# Patient Record
Sex: FEMALE | Race: WHITE | NOT HISPANIC OR LATINO | Employment: UNEMPLOYED | URBAN - METROPOLITAN AREA
[De-identification: names, ages, dates, MRNs, and addresses within clinical notes are randomized per-mention and may not be internally consistent; named-entity substitution may affect disease eponyms.]

---

## 2018-02-20 PROBLEM — M79.642 PAIN IN BOTH HANDS: Status: ACTIVE | Noted: 2018-02-20

## 2018-02-20 PROBLEM — M79.641 PAIN IN BOTH HANDS: Status: ACTIVE | Noted: 2018-02-20

## 2019-10-18 DIAGNOSIS — M79.672 PAIN IN LEFT FOOT: Primary | ICD-10-CM

## 2019-10-24 ENCOUNTER — OFFICE VISIT (OUTPATIENT)
Dept: ORTHOPEDICS | Facility: CLINIC | Age: 64
End: 2019-10-24
Payer: COMMERCIAL

## 2019-10-24 ENCOUNTER — HOSPITAL ENCOUNTER (OUTPATIENT)
Dept: RADIOLOGY | Facility: HOSPITAL | Age: 64
Discharge: HOME | End: 2019-10-24
Attending: ORTHOPAEDIC SURGERY
Payer: COMMERCIAL

## 2019-10-24 VITALS
HEIGHT: 67 IN | DIASTOLIC BLOOD PRESSURE: 76 MMHG | HEART RATE: 90 BPM | SYSTOLIC BLOOD PRESSURE: 144 MMHG | BODY MASS INDEX: 24.12 KG/M2 | WEIGHT: 153.69 LBS

## 2019-10-24 DIAGNOSIS — M79.672 PAIN IN LEFT FOOT: ICD-10-CM

## 2019-10-24 DIAGNOSIS — M20.5X2 CLAWTOE, ACQUIRED, LEFT: ICD-10-CM

## 2019-10-24 PROCEDURE — 73630 XR FOOT COMPLETE 3 VIEW LEFT: ICD-10-PCS | Mod: 26,LT,, | Performed by: RADIOLOGY

## 2019-10-24 PROCEDURE — 99999 PR PBB SHADOW E&M-EST. PATIENT-LVL III: ICD-10-PCS | Mod: PBBFAC,,, | Performed by: ORTHOPAEDIC SURGERY

## 2019-10-24 PROCEDURE — 99999 PR PBB SHADOW E&M-EST. PATIENT-LVL III: CPT | Mod: PBBFAC,,, | Performed by: ORTHOPAEDIC SURGERY

## 2019-10-24 PROCEDURE — 99203 OFFICE O/P NEW LOW 30 MIN: CPT | Mod: S$GLB,,, | Performed by: ORTHOPAEDIC SURGERY

## 2019-10-24 PROCEDURE — 73630 X-RAY EXAM OF FOOT: CPT | Mod: TC,PO,LT

## 2019-10-24 PROCEDURE — 73630 X-RAY EXAM OF FOOT: CPT | Mod: 26,LT,, | Performed by: RADIOLOGY

## 2019-10-24 PROCEDURE — 99203 PR OFFICE/OUTPT VISIT, NEW, LEVL III, 30-44 MIN: ICD-10-PCS | Mod: S$GLB,,, | Performed by: ORTHOPAEDIC SURGERY

## 2019-10-24 RX ORDER — IBUPROFEN 800 MG/1
800 TABLET ORAL 3 TIMES DAILY PRN
Qty: 60 TABLET | Refills: 3 | Status: ON HOLD | OUTPATIENT
Start: 2019-10-24 | End: 2020-11-30 | Stop reason: HOSPADM

## 2019-10-24 NOTE — LETTER
October 28, 2019      Eleazar Soler MD  [address] [facility] [address]  [address]  [facility] [address]           [facility]  [address] [place] [address]  Phone: [phone]          Patient: Arleen Oconnell   MR Number: [id number]   YOB: 1955   Date of Visit: 10/24/2019       Dear Dr. Eleazar Soler:    Thank you for referring Arleen Oconnell to me for evaluation. Attached you will find relevant portions of my assessment and plan of care.    If you have questions, please do not hesitate to call me. I look forward to following Arleen Oconnell along with you.    Sincerely,    Brian Cain MD    Enclosure  CC:  No Recipients    If you would like to receive this communication electronically, please contact [email or web address] or [phone] to request more information on [facility] [employer] access.    For providers and/or their staff who would like to refer a patient to [facility], please contact us through our one-stop-shop provider referral line, [facility], at [phone].    If you feel you have received this communication in error or would no longer like to receive these types of communications, please e-mail [email or web address]

## 2019-10-28 NOTE — PROGRESS NOTES
"HPI: Arleen Oconnell is a  64 y.o. female who was referred to me by Dr. Eleazar Soler and was seen in consultation today for left 2nd toe pain. She had bunion surgery several years ago and did well with that. She notices 2nd toe toe has become very painful and is starting to cross the great toe over the great toe. Pt denies weakness, numbness, and tingling. She rates her pain as 5/10 today. The pain is worse with shoe wear.      PAST MEDICAL/SURGICAL/FAMILY/SOCIAL/ HISTORY: REVIEWED    ALLERGIES/MEDICATIONS: REVIEWED       Review of Systems:     Constitution: Negative.   HEENT: Negative.   Eyes: Negative.   Cardiovascular: Negative.   Respiratory: Negative.   Endocrine: Negative.   Hematologic/Lymphatic: Negative.   Skin: Negative.   Musculoskeletal: Positive for left 2nd toe pain   Gastrointestinal: Negative.   Genitourinary: Negative.   Neurological: Negative.   Psychiatric/Behavioral: Negative.   Allergic/Immunologic: Negative.       PHYSICAL EXAM:  Vitals:    10/24/19 1311   BP: (!) 144/76   Pulse: 90     Ht Readings from Last 1 Encounters:   10/24/19 5' 7" (1.702 m)     Wt Readings from Last 1 Encounters:   10/24/19 69.7 kg (153 lb 10.6 oz)       GENERAL: Well developed, well nourished, no acute distress. Very pleasant.   SKIN: Skin is intact. No atrophy, abrasions or lesions are noted.   Neurological: Normal mental status. Appropriate and conversant. Alert and oriented x 3.  GAIT: Walks with non-antalgic gait.    Left  lower extremity compared with RLE:  2+ dorsalis pedis pulse.  Capillary refill < 3 seconds.  Normal range of motion tibiotalar and subtalar joints. Normal alignment of the forefoot and the hindfoot.  5/5 strength EHL, FHL, tibialis anterior, gastrocsoleus, tibialis posterior and peroneals. Sensation to light touch intact sural, saphenous, superficial peroneal and deep peroneal nerves.  No lymphadenopathy, no masses or tumors palpated.  Severe claw toe deformity with crossing over the great toe. "     XRAYS:   3 views of left foot obtained and reviewed today reveal 2nd claw toe with dorsal dislocation of the 2nd toe. Mild to moderate hallux valgus GABY 14 and HVA 25.2      ASSESSMENT:   Medications Ordered This Encounter   Medications    ibuprofen (ADVIL,MOTRIN) 800 MG tablet       Encounter Diagnosis   Name Primary?    Clawtoe, acquired, left         PLAN:  I spent 35 minutes in consulation with the patient today. More than half the time was spent counseling the patient on her condition and the options for operative versus non-operative care.  She has tried, shoe modification,  taping and strapping the toe but it is still quite painful. I recommend 2nd PIPJ resection arthoplasty, EDB tendon transfer and pinning of the 2nd mptj. I have explained the procedure, including indications, risks, and alternatives.  Arleen VUONG Marylinzuleyka gave informed consent and is medically ready for surgery.

## 2019-11-08 DIAGNOSIS — M20.5X2 CLAW TOE, ACQUIRED, LEFT: ICD-10-CM

## 2019-11-08 DIAGNOSIS — M20.5X2 CLAWTOE, ACQUIRED, LEFT: Primary | ICD-10-CM

## 2019-11-08 RX ORDER — SODIUM CHLORIDE 9 MG/ML
INJECTION, SOLUTION INTRAVENOUS CONTINUOUS
Status: CANCELLED | OUTPATIENT
Start: 2019-11-08

## 2019-11-11 ENCOUNTER — TELEPHONE (OUTPATIENT)
Dept: ORTHOPEDICS | Facility: CLINIC | Age: 64
End: 2019-11-11

## 2019-11-11 NOTE — TELEPHONE ENCOUNTER
----- Message from Aroldo Hernandez MA sent at 11/11/2019 11:46 AM CST -----  Contact: patient  Patient is having surgery on 12/2, will be out of town the entire week before, is there any pre op information she needs to know prior to surgery.  Call back

## 2019-11-18 ENCOUNTER — TELEPHONE (OUTPATIENT)
Dept: ORTHOPEDICS | Facility: CLINIC | Age: 64
End: 2019-11-18

## 2019-11-18 NOTE — TELEPHONE ENCOUNTER
----- Message from Aroldo Hernandez MA sent at 11/18/2019  2:48 PM CST -----  Contact: Patient  Patient needs to reschedule surgery  [facility]

## 2019-11-18 NOTE — TELEPHONE ENCOUNTER
----- Message from Rola Frias MA sent at 11/18/2019  1:21 PM CST -----  Contact: hannah   Wants to reschedule surgery, one week later   Call back

## 2019-12-05 ENCOUNTER — TELEPHONE (OUTPATIENT)
Dept: ORTHOPEDICS | Facility: CLINIC | Age: 64
End: 2019-12-05

## 2019-12-05 NOTE — TELEPHONE ENCOUNTER
Spoke to patient. Patient canceled surgery due to having to have gall bladder removed. Surgery canceled.

## 2019-12-05 NOTE — TELEPHONE ENCOUNTER
----- Message from Quentin Armas sent at 12/5/2019 12:37 PM CST -----  Contact: pr  Needs to cancel Sx, [phone]

## 2019-12-20 PROBLEM — K58.1 IRRITABLE BOWEL SYNDROME WITH CONSTIPATION: Status: ACTIVE | Noted: 2019-12-20

## 2020-01-15 PROBLEM — K80.20 SYMPTOMATIC CHOLELITHIASIS: Status: ACTIVE | Noted: 2020-01-15

## 2020-01-24 PROBLEM — K80.20 SYMPTOMATIC CHOLELITHIASIS: Status: RESOLVED | Noted: 2020-01-15 | Resolved: 2020-01-24

## 2020-05-08 ENCOUNTER — NURSE TRIAGE (OUTPATIENT)
Dept: ADMINISTRATIVE | Facility: CLINIC | Age: 65
End: 2020-05-08

## 2020-05-08 NOTE — TELEPHONE ENCOUNTER
Pt reports recent dx of left ear infection. She reports Covid symptoms. Reports constant heaviness in chest. Per protocol, she was advised to be seen in the ED for further evaluation.     Reason for Disposition   SEVERE or constant chest pain (Exception: mild central chest pain, present only when coughing)    Additional Information   Negative: SEVERE difficulty breathing (e.g., struggling for each breath, speaks in single words)   Negative: Difficult to awaken or acting confused (e.g., disoriented, slurred speech)   Negative: Bluish (or gray) lips or face now   Negative: Shock suspected (e.g., cold/pale/clammy skin, too weak to stand, low BP, rapid pulse)   Negative: Sounds like a life-threatening emergency to the triager    Protocols used: CORONAVIRUS (COVID-19) DIAGNOSED OR [id number]

## 2020-05-11 NOTE — TELEPHONE ENCOUNTER
Patient notified of Dr Castañeda's recommendations as written and verbalized understanding.  She states that she will call me for names of specialist if symptoms return or ear worsens.

## 2020-05-11 NOTE — TELEPHONE ENCOUNTER
"Triage nurse message below from 5/8 at 547 pm per routing hx. Spoke to pt. She did not go to ER. She went to urgent care on 4/28 for lt ear pain. Was given antibiotic and told to take an antihistamine. She states that it got better but still feels "a little twinge". She states that she has been taking her temp and no fever. She reports that she keeps a chronic cough due to allergies. She states that the heaviness in her chest is gone. Do you want to see here or refer to ENT?  "

## 2020-10-12 DIAGNOSIS — M79.672 PAIN IN LEFT FOOT: Primary | ICD-10-CM

## 2020-10-29 ENCOUNTER — OFFICE VISIT (OUTPATIENT)
Dept: ORTHOPEDICS | Facility: CLINIC | Age: 65
End: 2020-10-29
Payer: COMMERCIAL

## 2020-10-29 ENCOUNTER — HOSPITAL ENCOUNTER (OUTPATIENT)
Dept: RADIOLOGY | Facility: HOSPITAL | Age: 65
Discharge: HOME | End: 2020-10-29
Attending: ORTHOPAEDIC SURGERY
Payer: COMMERCIAL

## 2020-10-29 VITALS
SYSTOLIC BLOOD PRESSURE: 160 MMHG | HEIGHT: 67 IN | DIASTOLIC BLOOD PRESSURE: 72 MMHG | WEIGHT: 158.94 LBS | HEART RATE: 78 BPM | BODY MASS INDEX: 24.94 KG/M2

## 2020-10-29 DIAGNOSIS — M79.672 PAIN IN LEFT FOOT: ICD-10-CM

## 2020-10-29 DIAGNOSIS — M20.5X2 CLAWTOE, ACQUIRED, LEFT: Primary | ICD-10-CM

## 2020-10-29 PROCEDURE — 99999 PR PBB SHADOW E&M-EST. PATIENT-LVL III: ICD-10-PCS | Mod: PBBFAC,,, | Performed by: ORTHOPAEDIC SURGERY

## 2020-10-29 PROCEDURE — 1159F MED LIST DOCD IN RCRD: CPT | Mod: S$GLB,,, | Performed by: ORTHOPAEDIC SURGERY

## 2020-10-29 PROCEDURE — 3078F DIAST BP <80 MM HG: CPT | Mod: CPTII,S$GLB,, | Performed by: ORTHOPAEDIC SURGERY

## 2020-10-29 PROCEDURE — 73630 X-RAY EXAM OF FOOT: CPT | Mod: TC,PO,LT

## 2020-10-29 PROCEDURE — 3077F PR MOST RECENT SYSTOLIC BLOOD PRESSURE >= 140 MM HG: ICD-10-PCS | Mod: CPTII,S$GLB,, | Performed by: ORTHOPAEDIC SURGERY

## 2020-10-29 PROCEDURE — 3008F PR BODY MASS INDEX (BMI) DOCUMENTED: ICD-10-PCS | Mod: CPTII,S$GLB,, | Performed by: ORTHOPAEDIC SURGERY

## 2020-10-29 PROCEDURE — 99214 PR OFFICE/OUTPT VISIT, EST, LEVL IV, 30-39 MIN: ICD-10-PCS | Mod: S$GLB,,, | Performed by: ORTHOPAEDIC SURGERY

## 2020-10-29 PROCEDURE — 73630 X-RAY EXAM OF FOOT: CPT | Mod: 26,LT,, | Performed by: RADIOLOGY

## 2020-10-29 PROCEDURE — 1159F PR MEDICATION LIST DOCUMENTED IN MEDICAL RECORD: ICD-10-PCS | Mod: S$GLB,,, | Performed by: ORTHOPAEDIC SURGERY

## 2020-10-29 PROCEDURE — 73630 XR FOOT COMPLETE 3 VIEW LEFT: ICD-10-PCS | Mod: 26,LT,, | Performed by: RADIOLOGY

## 2020-10-29 PROCEDURE — 1126F AMNT PAIN NOTED NONE PRSNT: CPT | Mod: S$GLB,,, | Performed by: ORTHOPAEDIC SURGERY

## 2020-10-29 PROCEDURE — 1101F PT FALLS ASSESS-DOCD LE1/YR: CPT | Mod: CPTII,S$GLB,, | Performed by: ORTHOPAEDIC SURGERY

## 2020-10-29 PROCEDURE — 99999 PR PBB SHADOW E&M-EST. PATIENT-LVL III: CPT | Mod: PBBFAC,,, | Performed by: ORTHOPAEDIC SURGERY

## 2020-10-29 PROCEDURE — 3078F PR MOST RECENT DIASTOLIC BLOOD PRESSURE < 80 MM HG: ICD-10-PCS | Mod: CPTII,S$GLB,, | Performed by: ORTHOPAEDIC SURGERY

## 2020-10-29 PROCEDURE — 3008F BODY MASS INDEX DOCD: CPT | Mod: CPTII,S$GLB,, | Performed by: ORTHOPAEDIC SURGERY

## 2020-10-29 PROCEDURE — 3077F SYST BP >= 140 MM HG: CPT | Mod: CPTII,S$GLB,, | Performed by: ORTHOPAEDIC SURGERY

## 2020-10-29 PROCEDURE — 99214 OFFICE O/P EST MOD 30 MIN: CPT | Mod: S$GLB,,, | Performed by: ORTHOPAEDIC SURGERY

## 2020-10-29 PROCEDURE — 1126F PR PAIN SEVERITY QUANTIFIED, NO PAIN PRESENT: ICD-10-PCS | Mod: S$GLB,,, | Performed by: ORTHOPAEDIC SURGERY

## 2020-10-29 PROCEDURE — 1101F PR PT FALLS ASSESS DOC 0-1 FALLS W/OUT INJ PAST YR: ICD-10-PCS | Mod: CPTII,S$GLB,, | Performed by: ORTHOPAEDIC SURGERY

## 2020-10-29 NOTE — NURSING
Instructed patient on preop instructions. Patient to stop taking any blood thinning medications at least seven days prior to scheduled surgery. Patient to not take any NSAIDS at least 7 days prior to surgery and will resume when Dr. Cain instructs them to as it can delay bone healing. Nothing to eat/drink after midnight the night prior to surgery.   Patient instructed on postop care. Instructions included to remain non weight bearing until instructed otherwise by Dr. Cain. Post op dressing can not get wet. If it gets wet patient to call office immediately or go to [facility] ER to have it replaced. Patient to keep affected limb elevated higher than the heart at all times after surgery to decrease swelling. Further instructions given to patient on preop/postop instruction handout. No NSAID form given to patient. Handout on knee scooter given to patient as well. Patient verbalized understanding.

## 2020-10-29 NOTE — PROGRESS NOTES
"HPI: Arleen Oconnell is a  65 y.o. female who was referred to me by Dr. Eleazar Soler and was seen for f/u  today for left 2nd toe pain. She had bunion surgery several years ago and did well with that. She notices 2nd toe toe has become very painful and is starting to cross the great toe over the great toe. Pt denies weakness, numbness, and tingling. She rates her pain as 0/10 today but the pain is intermittent. The pain is worse with shoe wear. She was scheduled for surgery with me about a year ago and had to reschedule.      PAST MEDICAL/SURGICAL/FAMILY/SOCIAL/ HISTORY: REVIEWED    ALLERGIES/MEDICATIONS: REVIEWED       Review of Systems:     Constitution: Negative.   HEENT: Negative.   Eyes: Negative.   Cardiovascular: Negative.   Respiratory: Negative.   Endocrine: Negative.   Hematologic/Lymphatic: Negative.   Skin: Negative.   Musculoskeletal: Positive for left 2nd toe pain   Gastrointestinal: Negative.   Genitourinary: Negative.   Neurological: Negative.   Psychiatric/Behavioral: Negative.   Allergic/Immunologic: Negative.       PHYSICAL EXAM:  There were no vitals filed for this visit.  Ht Readings from Last 1 Encounters:   10/27/20 5' 7" (1.702 m)     Wt Readings from Last 1 Encounters:   10/27/20 72.1 kg (159 lb)       GENERAL: Well developed, well nourished, no acute distress. Very pleasant.   SKIN: Skin is intact. No atrophy, abrasions or lesions are noted.   Neurological: Normal mental status. Appropriate and conversant. Alert and oriented x 3.  GAIT: Walks with non-antalgic gait.    Left  lower extremity compared with RLE:  2+ dorsalis pedis pulse.  Capillary refill < 3 seconds.  Normal range of motion tibiotalar and subtalar joints. Normal alignment of the forefoot and the hindfoot.  5/5 strength EHL, FHL, tibialis anterior, gastrocsoleus, tibialis posterior and peroneals. Sensation to light touch intact sural, saphenous, superficial peroneal and deep peroneal nerves.  No lymphadenopathy, no masses or " tumors palpated.  Severe claw toe deformity with crossing over the great toe which has worsened over the past year.     XRAYS:   3 views of left foot obtained and reviewed today reveal 2nd claw toe with dorsal dislocation of the 2nd toe. Mild to moderate hallux valgus GABY 14 and HVA 25.2      ASSESSMENT:    Left 2nd toe clawtoe with crossover deformity    PLAN: She has tried, shoe modification,  taping and strapping the toe but it is still quite painful. I recommend 2nd PIPJ resection arthoplasty, EDB tendon transfer and pinning of the 2nd mptj. I have explained the procedure, including indications, risks, and alternatives.  Arleen Oconnell gave informed consent and is medically ready for surgery.

## 2020-11-10 ENCOUNTER — TELEPHONE (OUTPATIENT)
Dept: ORTHOPEDICS | Facility: CLINIC | Age: 65
End: 2020-11-10

## 2020-11-10 NOTE — TELEPHONE ENCOUNTER
----- Message from Loreto Billingsley sent at 11/10/2020  4:27 PM CST -----  Good afternoon cpt codes 48257,  36250 were approved but cpt code 94805 was denied it didn't meet medical necessity a peer 2 peer can be done by calling [phone] using ref#[id number]

## 2020-11-13 NOTE — TELEPHONE ENCOUNTER
Called jon, spoke to Valerie in the [id number] dept. Scheduled [id number] for 11/16/2020 at 430 pm. Their provider will call our office at that time to speak to Dr. Cain.

## 2020-11-16 ENCOUNTER — TELEPHONE (OUTPATIENT)
Dept: ORTHOPEDICS | Facility: CLINIC | Age: 65
End: 2020-11-16

## 2020-11-16 NOTE — TELEPHONE ENCOUNTER
----- Message from Angelique Bender sent at 11/16/2020  2:34 PM CST -----  Regarding: advice  Contact: TRAY with [employer]  Type: Needs Medical Advice  Who Called:  Tray with [employer]  Best Call Back Number: [phone]  Additional Information: Tray is trying to get her medication Mupirocin to her. Patient states the mail where she lives is unreliable. Ty is asking if she can send the medication  to the office for patient to . Please call Ty. Thanks!

## 2020-11-16 NOTE — TELEPHONE ENCOUNTER
Called Ty back. She states that they need a physical address to send out pts mupirocin. Pt told Ty that there is a problem getting [employer] deliveries at her home. Ty wanted to know if the medication could be sent to our office? Advised her that it could be sent here, but it may not get to the correct department on time. She will speak to pt to see where else they can send it. Thanks, Eve

## 2020-11-17 NOTE — TELEPHONE ENCOUNTER
Dr. Cain spoke to Dr. Mcclain today. Dr. Mcclain said that the code has been approved. He said that we need to resubmit the claim with all 3 of the CPT codes again. He said that there is not an auth number that he can give out. Please resubmit the 3 codes again. Thanks, Eve

## 2020-11-17 NOTE — TELEPHONE ENCOUNTER
Called [facility] back. Spoke to Konrad today. No one called yesterday at 430 pm to do the peer to peer. Konrad looked it up and states that the call was scheduled for this Friday at 430pm. Rescheduled call to today at 4 pm. Thanks, Eve

## 2020-11-27 ENCOUNTER — ANESTHESIA EVENT (OUTPATIENT)
Dept: SURGERY | Facility: HOSPITAL | Age: 65
End: 2020-11-27
Payer: COMMERCIAL

## 2020-11-27 ENCOUNTER — LAB VISIT (OUTPATIENT)
Dept: FAMILY MEDICINE | Facility: CLINIC | Age: 65
End: 2020-11-27
Payer: COMMERCIAL

## 2020-11-27 DIAGNOSIS — M20.5X2 CLAWTOE, ACQUIRED, LEFT: ICD-10-CM

## 2020-11-27 PROCEDURE — U0003 INFECTIOUS AGENT DETECTION BY NUCLEIC ACID (DNA OR RNA); SEVERE ACUTE RESPIRATORY SYNDROME CORONAVIRUS 2 (SARS-COV-2) (CORONAVIRUS DISEASE [COVID-19]), AMPLIFIED PROBE TECHNIQUE, MAKING USE OF HIGH THROUGHPUT TECHNOLOGIES AS DESCRIBED BY CMS-2020-01-R: HCPCS

## 2020-11-28 LAB — SARS-COV-2 RNA RESP QL NAA+PROBE: NOT DETECTED

## 2020-11-30 ENCOUNTER — HOSPITAL ENCOUNTER (OUTPATIENT)
Dept: RADIOLOGY | Facility: HOSPITAL | Age: 65
Discharge: HOME | End: 2020-11-30
Attending: ORTHOPAEDIC SURGERY | Admitting: ORTHOPAEDIC SURGERY
Payer: MEDICARE

## 2020-11-30 ENCOUNTER — ANESTHESIA (OUTPATIENT)
Dept: SURGERY | Facility: HOSPITAL | Age: 65
End: 2020-11-30
Payer: COMMERCIAL

## 2020-11-30 ENCOUNTER — HOSPITAL ENCOUNTER (OUTPATIENT)
Facility: HOSPITAL | Age: 65
Discharge: HOME | End: 2020-11-30
Attending: ORTHOPAEDIC SURGERY | Admitting: ORTHOPAEDIC SURGERY
Payer: COMMERCIAL

## 2020-11-30 DIAGNOSIS — S92.902A FOOT FRACTURE, LEFT: ICD-10-CM

## 2020-11-30 DIAGNOSIS — M20.5X2 CLAWTOE, ACQUIRED, LEFT: Primary | ICD-10-CM

## 2020-11-30 PROCEDURE — 37000009 HC ANESTHESIA EA ADD 15 MINS: Mod: PO | Performed by: ORTHOPAEDIC SURGERY

## 2020-11-30 PROCEDURE — 27201423 OPTIME MED/SURG SUP & DEVICES STERILE SUPPLY: Mod: PO | Performed by: ORTHOPAEDIC SURGERY

## 2020-11-30 PROCEDURE — 25000003 PHARM REV CODE 250: Mod: PO | Performed by: ANESTHESIOLOGY

## 2020-11-30 PROCEDURE — 25000003 PHARM REV CODE 250: Mod: PO | Performed by: ORTHOPAEDIC SURGERY

## 2020-11-30 PROCEDURE — 28645 PR OPEN TX METATARSOPHALANGEAL JOINT DISLOCATION: ICD-10-PCS | Mod: LT,,, | Performed by: ORTHOPAEDIC SURGERY

## 2020-11-30 PROCEDURE — C1776 JOINT DEVICE (IMPLANTABLE): HCPCS | Mod: PO | Performed by: ORTHOPAEDIC SURGERY

## 2020-11-30 PROCEDURE — 36000709 HC OR TIME LEV III EA ADD 15 MIN: Mod: PO | Performed by: ORTHOPAEDIC SURGERY

## 2020-11-30 PROCEDURE — 28313 REPAIR DEFORMITY OF TOE: CPT | Mod: 59,51,T1, | Performed by: ORTHOPAEDIC SURGERY

## 2020-11-30 PROCEDURE — 28313 PR RECONSTRUC TOE DEFORM,SOFT TISSUE: ICD-10-PCS | Mod: 59,51,T1, | Performed by: ORTHOPAEDIC SURGERY

## 2020-11-30 PROCEDURE — 37000008 HC ANESTHESIA 1ST 15 MINUTES: Mod: PO | Performed by: ORTHOPAEDIC SURGERY

## 2020-11-30 PROCEDURE — 63600175 PHARM REV CODE 636 W HCPCS: Mod: PO | Performed by: ORTHOPAEDIC SURGERY

## 2020-11-30 PROCEDURE — D9220A PRA ANESTHESIA: Mod: ANES,,, | Performed by: ANESTHESIOLOGY

## 2020-11-30 PROCEDURE — D9220A PRA ANESTHESIA: ICD-10-PCS | Mod: ANES,,, | Performed by: ANESTHESIOLOGY

## 2020-11-30 PROCEDURE — 76000 FLUOROSCOPY <1 HR PHYS/QHP: CPT | Mod: TC,PO

## 2020-11-30 PROCEDURE — D9220A PRA ANESTHESIA: ICD-10-PCS | Mod: CRNA,,, | Performed by: NURSE ANESTHETIST, CERTIFIED REGISTERED

## 2020-11-30 PROCEDURE — 28285 PR REPAIR OF HAMMERTOE,ONE: ICD-10-PCS | Mod: 51,T1,, | Performed by: ORTHOPAEDIC SURGERY

## 2020-11-30 PROCEDURE — 36000708 HC OR TIME LEV III 1ST 15 MIN: Mod: PO | Performed by: ORTHOPAEDIC SURGERY

## 2020-11-30 PROCEDURE — 25000003 PHARM REV CODE 250: Mod: PO | Performed by: NURSE ANESTHETIST, CERTIFIED REGISTERED

## 2020-11-30 PROCEDURE — 71000016 HC POSTOP RECOV ADDL HR: Mod: PO | Performed by: ORTHOPAEDIC SURGERY

## 2020-11-30 PROCEDURE — D9220A PRA ANESTHESIA: Mod: CRNA,,, | Performed by: NURSE ANESTHETIST, CERTIFIED REGISTERED

## 2020-11-30 PROCEDURE — 71000033 HC RECOVERY, INTIAL HOUR: Mod: PO | Performed by: ORTHOPAEDIC SURGERY

## 2020-11-30 PROCEDURE — 28645 REPAIR TOE DISLOCATION: CPT | Mod: LT,,, | Performed by: ORTHOPAEDIC SURGERY

## 2020-11-30 PROCEDURE — 28285 REPAIR OF HAMMERTOE: CPT | Mod: 51,T1,, | Performed by: ORTHOPAEDIC SURGERY

## 2020-11-30 PROCEDURE — 63600175 PHARM REV CODE 636 W HCPCS: Mod: PO | Performed by: NURSE ANESTHETIST, CERTIFIED REGISTERED

## 2020-11-30 PROCEDURE — 71000015 HC POSTOP RECOV 1ST HR: Mod: PO | Performed by: ORTHOPAEDIC SURGERY

## 2020-11-30 PROCEDURE — 63600175 PHARM REV CODE 636 W HCPCS: Mod: PO | Performed by: ANESTHESIOLOGY

## 2020-11-30 DEVICE — TOE HAMMERTOE SMALL: Type: IMPLANTABLE DEVICE | Site: TOE | Status: FUNCTIONAL

## 2020-11-30 RX ORDER — BUPIVACAINE HYDROCHLORIDE 5 MG/ML
INJECTION, SOLUTION EPIDURAL; INTRACAUDAL
Status: DISCONTINUED | OUTPATIENT
Start: 2020-11-30 | End: 2020-11-30 | Stop reason: HOSPADM

## 2020-11-30 RX ORDER — LIDOCAINE HYDROCHLORIDE 10 MG/ML
1 INJECTION, SOLUTION EPIDURAL; INFILTRATION; INTRACAUDAL; PERINEURAL ONCE
Status: DISCONTINUED | OUTPATIENT
Start: 2020-11-30 | End: 2020-11-30 | Stop reason: HOSPADM

## 2020-11-30 RX ORDER — FENTANYL CITRATE 50 UG/ML
INJECTION, SOLUTION INTRAMUSCULAR; INTRAVENOUS
Status: DISCONTINUED | OUTPATIENT
Start: 2020-11-30 | End: 2020-11-30

## 2020-11-30 RX ORDER — FENTANYL CITRATE 50 UG/ML
25 INJECTION, SOLUTION INTRAMUSCULAR; INTRAVENOUS EVERY 5 MIN PRN
Status: DISCONTINUED | OUTPATIENT
Start: 2020-11-30 | End: 2020-11-30 | Stop reason: HOSPADM

## 2020-11-30 RX ORDER — SODIUM CHLORIDE, SODIUM LACTATE, POTASSIUM CHLORIDE, CALCIUM CHLORIDE 600; 310; 30; 20 MG/100ML; MG/100ML; MG/100ML; MG/100ML
INJECTION, SOLUTION INTRAVENOUS CONTINUOUS
Status: DISCONTINUED | OUTPATIENT
Start: 2020-11-30 | End: 2020-11-30 | Stop reason: HOSPADM

## 2020-11-30 RX ORDER — HYDROMORPHONE HYDROCHLORIDE 2 MG/ML
0.2 INJECTION, SOLUTION INTRAMUSCULAR; INTRAVENOUS; SUBCUTANEOUS EVERY 5 MIN PRN
Status: DISCONTINUED | OUTPATIENT
Start: 2020-11-30 | End: 2020-11-30 | Stop reason: HOSPADM

## 2020-11-30 RX ORDER — OXYCODONE AND ACETAMINOPHEN 10; 325 MG/1; MG/1
1 TABLET ORAL EVERY 4 HOURS PRN
Qty: 24 TABLET | Refills: 0 | Status: SHIPPED | OUTPATIENT
Start: 2020-11-30 | End: 2020-12-04

## 2020-11-30 RX ORDER — FAMOTIDINE 10 MG/ML
20 INJECTION INTRAVENOUS ONCE
Status: COMPLETED | OUTPATIENT
Start: 2020-11-30 | End: 2020-11-30

## 2020-11-30 RX ORDER — PROPOFOL 10 MG/ML
VIAL (ML) INTRAVENOUS
Status: DISCONTINUED | OUTPATIENT
Start: 2020-11-30 | End: 2020-11-30

## 2020-11-30 RX ORDER — MUPIROCIN 20 MG/G
OINTMENT TOPICAL
Status: DISCONTINUED | OUTPATIENT
Start: 2020-11-30 | End: 2020-11-30 | Stop reason: HOSPADM

## 2020-11-30 RX ORDER — OXYCODONE HYDROCHLORIDE 5 MG/1
5 TABLET ORAL
Status: DISCONTINUED | OUTPATIENT
Start: 2020-11-30 | End: 2020-11-30 | Stop reason: HOSPADM

## 2020-11-30 RX ORDER — LIDOCAINE HCL/PF 100 MG/5ML
SYRINGE (ML) INTRAVENOUS
Status: DISCONTINUED | OUTPATIENT
Start: 2020-11-30 | End: 2020-11-30

## 2020-11-30 RX ORDER — SCOLOPAMINE TRANSDERMAL SYSTEM 1 MG/1
1 PATCH, EXTENDED RELEASE TRANSDERMAL ONCE
Status: DISCONTINUED | OUTPATIENT
Start: 2020-11-30 | End: 2020-11-30 | Stop reason: HOSPADM

## 2020-11-30 RX ORDER — MIDAZOLAM HYDROCHLORIDE 1 MG/ML
INJECTION INTRAMUSCULAR; INTRAVENOUS
Status: DISCONTINUED | OUTPATIENT
Start: 2020-11-30 | End: 2020-11-30

## 2020-11-30 RX ORDER — LIDOCAINE HYDROCHLORIDE 20 MG/ML
INJECTION, SOLUTION EPIDURAL; INFILTRATION; INTRACAUDAL; PERINEURAL
Status: DISCONTINUED | OUTPATIENT
Start: 2020-11-30 | End: 2020-11-30 | Stop reason: HOSPADM

## 2020-11-30 RX ORDER — CEFAZOLIN SODIUM 2 G/50ML
2 SOLUTION INTRAVENOUS
Status: COMPLETED | OUTPATIENT
Start: 2020-11-30 | End: 2020-11-30

## 2020-11-30 RX ADMIN — FAMOTIDINE 20 MG: 10 INJECTION INTRAVENOUS at 06:11

## 2020-11-30 RX ADMIN — PROPOFOL 150 MG: 10 INJECTION, EMULSION INTRAVENOUS at 07:11

## 2020-11-30 RX ADMIN — MIDAZOLAM HYDROCHLORIDE 2 MG: 1 INJECTION, SOLUTION INTRAMUSCULAR; INTRAVENOUS at 06:11

## 2020-11-30 RX ADMIN — SODIUM CHLORIDE, SODIUM LACTATE, POTASSIUM CHLORIDE, AND CALCIUM CHLORIDE: .6; .31; .03; .02 INJECTION, SOLUTION INTRAVENOUS at 06:11

## 2020-11-30 RX ADMIN — NITROGLYCERIN 0.5 INCH: 20 OINTMENT TOPICAL at 09:11

## 2020-11-30 RX ADMIN — FENTANYL CITRATE 50 MCG: 50 INJECTION, SOLUTION INTRAMUSCULAR; INTRAVENOUS at 07:11

## 2020-11-30 RX ADMIN — CEFAZOLIN SODIUM 2 G: 2 SOLUTION INTRAVENOUS at 06:11

## 2020-11-30 RX ADMIN — LIDOCAINE HYDROCHLORIDE 100 MG: 20 INJECTION PARENTERAL at 07:11

## 2020-11-30 RX ADMIN — SCOPALAMINE 1 PATCH: 1 PATCH, EXTENDED RELEASE TRANSDERMAL at 06:11

## 2020-11-30 NOTE — BRIEF OP NOTE
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 toe clawtoe with severe crossover deformity 2) Leftt 2nd metatarsalphalangeal joint dislocation    PROCEDURE: 1) Left 2nd toe claw toe correction with PIPJ resection arthroplasty 2) Left 2nd toe correction of crossover valgus toe deformity 3) Open reduction internal fixation Left 2nd  metatarsalphalangeal joint dislocation    SURGEON: Brian Cain MD    ANESTHESIA TYPE: LMA    SPECIMENS SENT: NONE     COMPLICATIONS: NONE     BLOOD LOSS: < 5 cc     ASSISTANT: MINNA Arias

## 2020-11-30 NOTE — DISCHARGE SUMMARY
"[facility]  Discharge Note  Short Stay    Procedure(s) (LRB):  CORRECTION, HAMMER TOE (Left)  RECONSTRUCTION-PLANTAR-TOE (Left)  ORIF, FRACTURE, METATARSAL BONE (Left)    OUTCOME: Patient tolerated treatment/procedure well without complication and is now ready for discharge.    DISPOSITION: Home or Self Care    FINAL DIAGNOSIS:  Clawtoe, acquired, left    FOLLOWUP: In clinic on 12/15/20    DISCHARGE INSTRUCTIONS:    Discharge Procedure Orders   CRUTCHES FOR HOME USE     Order Specific Question Answer Comments   Type: Axillary    Height: 5' 7" (1.702 m)    Weight: 72.1 kg (158 lb 15.2 oz)    Length of need (1-99 months): 2      Diet general     Call MD for:  temperature >100.4     Call MD for:  persistent nausea and vomiting     Call MD for:  severe uncontrolled pain     Call MD for:  difficulty breathing, headache or visual disturbances     Call MD for:  redness, tenderness, or signs of infection (pain, swelling, redness, odor or green/yellow discharge around incision site)     Call MD for:  hives     Call MD for:  persistent dizziness or light-headedness     Call MD for:  extreme fatigue     Keep surgical extremity elevated     No driving, operating heavy equipment or signing legal documents while taking pain medication     Leave dressing on - Keep it clean, dry, and intact until clinic visit     Weight bearing restrictions (specify)   Order Comments: weight bearing on heel only in darco shoe         "

## 2020-11-30 NOTE — DISCHARGE INSTRUCTIONS
FOOT SURGERY  After surgery:    DOS:   Keep leg elevated until first post operative visit   Keep ice pack on foot for 20 minutes each hour while awake for next 24-48 hours.   Keep dressing clean and dry DO NOT CHANGE BANDAGES   Advance diet as tolerated.    Check circulation frequently in toes by pressing down on toenail. Nail should turn white and then pink WITHIN 3 SECONDS when released.   Wear surgical shoe/boot. May remove to shower.   Do not walk without shoe/boot.   Resume home medications    DONT:   Do not remove your dressing   Do not get dressing wet.   No driving until released by MD   DO NOT TAKE ADDITIONAL TYLENOL/ACETAMINOPHEN WHILE TAKING NARCOTIC PAIN MEDICATION THAT CONTAINS TYLENOL/ACETAMINOPHEN.    CALL PHYSICIAN FOR:   Burning, or numbness of the toes not relieved by elevation of the leg.   Pale or cold toes; bluish nail beds.   Redness, swelling, or bleeding.   Fever> 101   Drainage (pus) from the operative sites   Pain unrelieved by pain medication    FOR EMERGENCIES CONTACT YOUR PHYSICIAN'S OFFICE      Post Operative Instructions    Dressing:    You will have a soft dressing on the foot following surgery    The dressing will remain in place for  2 weeks    You will be Weight bearing as tolerated on the heel in a darco shoe for the first 2 weeks.      Wound:    The surgical incision has been closed with sutures    Do not get the dressing/splint wet and do not remove the dressing/splint for 2 weeks. When showering place a bag over the dressing and secure with tape or rubberband to your leg to avoid the cast and wound getting wet and still put the leg out of the shower      Do not remove the dressing until your 2 week appointment - the first dressing change will occur at your 2 week appointment    Stitches will be removed at your 2 week appointment if the incision is healed    Once the dressing is removed and sutures are removed you can shower and wash the foot     Do not  immerse the foot in water (bath, hot tub, pool, lake, pond, river, ocean) for 4 weeks    You will apply scar cream and pain cream together to the incisions once in the boot    Weight Bearing:    You will be weight bearing on the heel for the first two weeks in the darco shoe.  You will be given crutches or a walker    After 2-4 weeks you may begin fully walking on the foot    Medications:    You will be given a prescription for pain medication     Pain medication should be used regularly for the first 24-48 hours, when required for the first 1 to 2 weeks, followed by Regular Tylenol     Driving:     For right foot surgery you are not permitted to drive for 6 weeks    For left foot surgery, please contact your insurance company to see if you are permitted to drive    Driving is not permitted while on narcotics    Work:    Two weeks off work is recommended for initial recovery    If you are able to get to work safely, and will be seated with the foot elevated for the majority of the day, you may return to work a couple days after surgery. This is assuming you are not taking narcotic pain mediation.    From 2-6 weeks sedentary duties is recommended    By 6 weeks you can slowly return to your normal duties    If your job is physically demanding, return to full duties is usually possible around 12 weeks post operatively    Follow Up:    You will have your first appointment 2 weeks after surgery in the Clinic      Recovery:    It is normal to experience mild to moderate pain, numbness, or tingling for the first 2 weeks following surgery    Please come to the emergency department if you are suffering from severe pain    You will get back to most of your activities by 3 months    Swelling often remains for 6-12 months    You are expected to experience a maximal improvement from surgery in 9-12 months    Physiotherapy:    Formal physiotherapy is usually not necessary     Do not remove your dressing or let anyone else  including a physician remove your dressing unless otherwise instructed by Dr. Cain. You may over wrap the dressing if there is any blood or fluid oozing through the ace bandage.

## 2020-11-30 NOTE — H&P
"HPI: Arleen Oconnell is a  65 y.o. female who was referred to me by Dr. Eleazar Soler and was seen for f/u  today for left 2nd toe pain. She had bunion surgery several years ago and did well with that. She notices 2nd toe toe has become very painful and is starting to cross the great toe over the great toe. Pt denies weakness, numbness, and tingling. She rates her pain as 0/10 today but the pain is intermittent. The pain is worse with shoe wear. She was scheduled for surgery with me about a year ago and had to reschedule.      PAST MEDICAL/SURGICAL/FAMILY/SOCIAL/ HISTORY: REVIEWED    ALLERGIES/MEDICATIONS: REVIEWED         Review of Systems:     Constitution: Negative.   HEENT: Negative.   Eyes: Negative.   Cardiovascular: Negative.   Respiratory: Negative.   Endocrine: Negative.   Hematologic/Lymphatic: Negative.   Skin: Negative.   Musculoskeletal: Positive for left 2nd toe pain   Gastrointestinal: Negative.   Genitourinary: Negative.   Neurological: Negative.   Psychiatric/Behavioral: Negative.   Allergic/Immunologic: Negative.         PHYSICAL EXAM:  There were no vitals filed for this visit.      Ht Readings from Last 1 Encounters:   10/27/20 5' 7" (1.702 m)          Wt Readings from Last 1 Encounters:   10/27/20 72.1 kg (159 lb)        GENERAL: Well developed, well nourished, no acute distress. Very pleasant.   SKIN: Skin is intact. No atrophy, abrasions or lesions are noted.   Neurological: Normal mental status. Appropriate and conversant. Alert and oriented x 3.  GAIT: Walks with non-antalgic gait.     Left  lower extremity compared with RLE:  2+ dorsalis pedis pulse.  Capillary refill < 3 seconds.  Normal range of motion tibiotalar and subtalar joints. Normal alignment of the forefoot and the hindfoot.  5/5 strength EHL, FHL, tibialis anterior, gastrocsoleus, tibialis posterior and peroneals. Sensation to light touch intact sural, saphenous, superficial peroneal and deep peroneal nerves.  No lymphadenopathy, " no masses or tumors palpated.  Severe claw toe deformity with crossing over the great toe which has worsened over the past year.      XRAYS:   3 views of left foot obtained and reviewed today reveal 2nd claw toe with dorsal dislocation of the 2nd toe. Mild to moderate hallux valgus GABY 14 and HVA 25.2        ASSESSMENT:    Left 2nd toe clawtoe with crossover deformity    PLAN: She has tried, shoe modification,  taping and strapping the toe but it is still quite painful. I recommend 2nd PIPJ resection arthoplasty, EDB tendon transfer and pinning of the 2nd mtpj, possible 2nd weil osteotomy. I have explained the procedure, including indications, risks, and alternatives.  Arleen Oconnell gave informed consent and is medically ready for surgery.

## 2020-11-30 NOTE — OP NOTE
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 toe clawtoe with severe crossover deformity 2) Leftt 2nd metatarsalphalangeal joint dislocation    PROCEDURE: 1) Left 2nd toe claw toe correction with PIPJ resection arthroplasty 2) Left 2nd toe correction of crossover valgus toe deformity 3) Open reduction internal fixation Left 2nd  metatarsalphalangeal joint dislocation    SURGEON: Brian Cain MD    ANESTHESIA TYPE: LMA    SPECIMENS SENT: NONE     COMPLICATIONS: NONE     BLOOD LOSS: < 5 cc     ASSISTANT: MINNA Arias     Procedure in detail:  After appropriate informed consent was obtained the patient was taken to the OR and placed in the supine position  on the operating table. The Left lower extremity was prepped and draped in the usual sterile fashion. The calf tourniquet was raised to 250 mmHg after esmarch exsanguination of the limb.     Ankle block was administered using 30 cc of a 1:1 mixture of 1% lidocaine plain and 0.25% Marcaine plain.    A 7 cm incision was made overlying the dorsum of the 2nd toe and 2nd metatarsal using a #15 blade. Littler scissors were used to dissect down to the level of the 2nd metatarsalphalangeal joint. The extensor digitorum longus tendon was lengthened in a Z-type fashion. The contracted medial an dorsal soft tissue was released and capsulotomy of the 2nd  metatarsalphalangeal joint was performed.       Next the the extensor digitorum longus tendon was split longitudinally.  Proximal interphalangeal joint resection arthroplasty was performed using a sagittal saw.  Approximately 1 cm of bone was resected as the toe was very long. Next a small Janusz toe tac was placed into the PIPJ after prepping the joint. There was good alignment of the joints and good position of wire on  AP/LAT/Oblique views of the foot under fluoroscopy.     Next a 2.0 mm drill bit was used to drill a bone tunnel across the 2nd metatarsal neck. The extensor digitorum brevis tendon was then passed through the bone tunnel to the medial aspect of the 2nd  metatarsalphalangeal joint. The tendon transfer was used to correct the valgus and severe crossover deformity of the 2nd toe and prevent recurrence of the crossover toe deformity. Next the proximal interphalangeal joint was pinned from the tip of the 2nd toe across the 2nd metatarsalphalangeal joint using a 0.045 K-wire in a retrograde fashion. The 2nd  metatarsalphalangeal joint was dislocated dorsally and medially. Open reduction internal fixation of the 2nd  metatarsalphalangeal joint was performed using the 0.045 k-wire for fixation. There was good alignment of the joints and good position of wire on AP/LAT/Oblique views of the foot under fluoroscopy.  The K-wire was cut and jurgan's ball was placed.   The Extensor Digitorum Brevis Tendon end was sutured into the lateral capsule of the 2nd  metatarsalphalangeal joint using 2.0 ethibond interrupted sutures.     The incisions were irrigated with normal saline. The tourniquet was let down, adequate hemostasis was achieved using bovie cautery. Next the extensor digitorum longus tendon was repaired using 3.0 monocryl interrupted sutures. The distal end of the EDB was sutured into the lateral capsule of the 2nd  metatarsalphalangeal joint to assist with correction of valgus deformity.  The deep layer of the incisions were re-approximated using 2.0-monocyrl in an interrupted fashion.   The subcutaneous layers was re-approximated using 3.0-monocyrl in an interrupted fashion. The skin was re-approximated using 3.0 nylon in a running fashion. Sterile dressing using xeroform, bacitracin, 4x8s, cast padding, posterior splint and ace wrap were placed. Patient tolerated the procedure well without complications.  I was present  and scrubbed for the entire case.

## 2020-11-30 NOTE — ANESTHESIA POSTPROCEDURE EVALUATION
Anesthesia Post Evaluation    Patient: Arleen Oconnell    Procedure(s) Performed: Procedure(s) (LRB):  CORRECTION, HAMMER TOE (Left)  RECONSTRUCTION-PLANTAR-TOE (Left)  ORIF, FRACTURE, METATARSAL BONE (Left)    Final Anesthesia Type: general    Patient location during evaluation: PACU  Patient participation: Yes- Able to Participate  Level of consciousness: sedated and awake  Post-procedure vital signs: reviewed and stable  Pain management: adequate  Airway patency: patent    PONV status at discharge: No PONV  Anesthetic complications: no      Cardiovascular status: blood pressure returned to baseline  Respiratory status: spontaneous ventilation  Hydration status: euvolemic  Follow-up not needed.          Vitals Value Taken Time   /64 11/30/20 0805   Temp 36.4 °C (97.5 °F) 11/30/20 0805   Pulse 73 11/30/20 0805   Resp 12 11/30/20 0805   SpO2 95 % 11/30/20 0805         No case tracking events are documented in the log.      Pain/Acacia Score: Acacia Score: 6 (11/30/2020  8:04 AM)         No action ordered at this time, continue to monitor

## 2020-11-30 NOTE — ANESTHESIA PROCEDURE NOTES
Intubation  Performed by: Louisa Blanc CRNA  Authorized by: Gabino Hayden MD     Intubation:     Induction:  Intravenous    Intubated:  Postinduction    Mask Ventilation:  Easy mask    Attempts:  1    Attempted By:  CRNA    Method of Intubation:  Other (see comments)    Difficult Airway Encountered?: No      Complications:  None    Airway Device:  Supraglottic airway/LMA    Airway Device Size:  4.0    Style/Cuff Inflation:  Cuffed    Placement Verified By:  Capnometry    Complicating Factors:  None

## 2020-11-30 NOTE — TRANSFER OF CARE
"Anesthesia Transfer of Care Note    Patient: Arleen Oconnell    Procedure(s) Performed: Procedure(s) (LRB):  CORRECTION, HAMMER TOE (Left)  RECONSTRUCTION-PLANTAR-TOE (Left)  ORIF, FRACTURE, METATARSAL BONE (Left)    Patient location: PACU    Anesthesia Type: general    Transport from OR: Transported from OR on room air with adequate spontaneous ventilation    Post pain: adequate analgesia    Post assessment: no apparent anesthetic complications and tolerated procedure well    Post vital signs: stable    Level of consciousness: sedated    Nausea/Vomiting: no nausea/vomiting    Complications: none    Transfer of care protocol was followed      Last vitals:   Visit Vitals  BP (!) 145/70 (BP Location: Right arm, Patient Position: Lying)   Pulse 75   Temp 36.6 °C (97.9 °F) (Skin)   Resp 16   Ht 5' 7" (1.702 m)   Wt 72.1 kg (158 lb 15.2 oz)   SpO2 98%   Breastfeeding No   BMI 24.90 kg/m²     "

## 2020-12-01 VITALS
HEIGHT: 67 IN | DIASTOLIC BLOOD PRESSURE: 63 MMHG | BODY MASS INDEX: 24.94 KG/M2 | RESPIRATION RATE: 16 BRPM | OXYGEN SATURATION: 99 % | TEMPERATURE: 98 F | WEIGHT: 158.94 LBS | SYSTOLIC BLOOD PRESSURE: 133 MMHG | HEART RATE: 70 BPM

## 2020-12-03 ENCOUNTER — TELEPHONE (OUTPATIENT)
Dept: ORTHOPEDICS | Facility: CLINIC | Age: 65
End: 2020-12-03

## 2020-12-03 NOTE — TELEPHONE ENCOUNTER
----- Message from Rola Frias MA sent at 12/3/2020  1:34 PM CST -----  Contact: pt  Wants call back , wants limitations.   Call back

## 2020-12-03 NOTE — TELEPHONE ENCOUNTER
Spoke with patient let her know her limitations per Dr. Cain's discharge summary. Understanding verbalized.

## 2020-12-04 DIAGNOSIS — M20.5X2 CLAWTOE, ACQUIRED, LEFT: Primary | ICD-10-CM

## 2020-12-04 RX ORDER — OXYCODONE AND ACETAMINOPHEN 5; 325 MG/1; MG/1
1 TABLET ORAL EVERY 6 HOURS PRN
Qty: 24 TABLET | Refills: 0 | Status: SHIPPED | OUTPATIENT
Start: 2020-12-04 | End: 2020-12-15 | Stop reason: SDUPTHER

## 2020-12-04 NOTE — TELEPHONE ENCOUNTER
----- Message from Quentin Armas sent at 12/4/2020 10:46 AM CST -----  Regarding: medication question  Contact: pt   call

## 2020-12-04 NOTE — TELEPHONE ENCOUNTER
Brian Cain MD  You 7 minutes ago (12:20 PM)     Ok to refill percocet 5 sig 1 po q 6 #24 RF none    Message text

## 2020-12-04 NOTE — TELEPHONE ENCOUNTER
Called pt. She wanted clarification on her restrictions. Advised that she can be weight bearing as tolerated on heel only in her Darco shoe. She can use the crutches to help steady self ambulating. She also asked for refill on pain medication. She will run out over the weekend. Advised will message Dr. Cain. Thanks, Eve

## 2020-12-09 ENCOUNTER — TELEPHONE (OUTPATIENT)
Dept: ORTHOPEDICS | Facility: CLINIC | Age: 65
End: 2020-12-09

## 2020-12-09 DIAGNOSIS — M20.5X2 CLAWTOE, ACQUIRED, LEFT: Primary | ICD-10-CM

## 2020-12-09 NOTE — TELEPHONE ENCOUNTER
Called pt back. She states that she is not having any pain in her left foot and wants to know if she can return to work? Pt states that she works at a [employer]. They are getting ready for a recital. She states that she would be able to sit with her foot elevated during the day at work. 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 Advised that Dr. Cain is in surgery on Wednesdays. Advised that I would send her the message and get back with pt tomorrow. Please advise. Thanks, Eve

## 2020-12-09 NOTE — TELEPHONE ENCOUNTER
----- Message from Rola Frias MA sent at 12/9/2020 12:28 PM CST -----  Contact: pt  Feel better,  not taking pain medications   Wants to return to work   Call back

## 2020-12-10 NOTE — TELEPHONE ENCOUNTER
Brian Cain MD  You 1 hour ago (6:46 AM)     She can return to work as long as she can elevate her foot and protect the pin.    Message text

## 2020-12-10 NOTE — TELEPHONE ENCOUNTER
Left message on voicemail for pt. Per Dr. Cain, pt can return to work as long as she can elevate her left foot and protect the pin. Thanks, Eve

## 2020-12-15 ENCOUNTER — OFFICE VISIT (OUTPATIENT)
Dept: ORTHOPEDICS | Facility: CLINIC | Age: 65
End: 2020-12-15
Payer: COMMERCIAL

## 2020-12-15 ENCOUNTER — HOSPITAL ENCOUNTER (OUTPATIENT)
Dept: RADIOLOGY | Facility: HOSPITAL | Age: 65
Discharge: HOME | End: 2020-12-15
Attending: ORTHOPAEDIC SURGERY
Payer: COMMERCIAL

## 2020-12-15 VITALS
HEIGHT: 67 IN | WEIGHT: 158 LBS | HEART RATE: 107 BPM | SYSTOLIC BLOOD PRESSURE: 150 MMHG | DIASTOLIC BLOOD PRESSURE: 83 MMHG | BODY MASS INDEX: 24.8 KG/M2

## 2020-12-15 DIAGNOSIS — M20.5X2 CLAWTOE, ACQUIRED, LEFT: ICD-10-CM

## 2020-12-15 PROCEDURE — 3008F BODY MASS INDEX DOCD: CPT | Mod: CPTII,S$GLB,, | Performed by: ORTHOPAEDIC SURGERY

## 2020-12-15 PROCEDURE — 1125F PR PAIN SEVERITY QUANTIFIED, PAIN PRESENT: ICD-10-PCS | Mod: S$GLB,,, | Performed by: ORTHOPAEDIC SURGERY

## 2020-12-15 PROCEDURE — 73630 X-RAY EXAM OF FOOT: CPT | Mod: 26,LT,, | Performed by: RADIOLOGY

## 2020-12-15 PROCEDURE — 3288F PR FALLS RISK ASSESSMENT DOCUMENTED: ICD-10-PCS | Mod: CPTII,S$GLB,, | Performed by: ORTHOPAEDIC SURGERY

## 2020-12-15 PROCEDURE — 1101F PT FALLS ASSESS-DOCD LE1/YR: CPT | Mod: CPTII,S$GLB,, | Performed by: ORTHOPAEDIC SURGERY

## 2020-12-15 PROCEDURE — 99999 PR PBB SHADOW E&M-EST. PATIENT-LVL III: ICD-10-PCS | Mod: PBBFAC,,, | Performed by: ORTHOPAEDIC SURGERY

## 2020-12-15 PROCEDURE — 99999 PR PBB SHADOW E&M-EST. PATIENT-LVL III: CPT | Mod: PBBFAC,,, | Performed by: ORTHOPAEDIC SURGERY

## 2020-12-15 PROCEDURE — 73630 X-RAY EXAM OF FOOT: CPT | Mod: TC,PO,LT

## 2020-12-15 PROCEDURE — 3288F FALL RISK ASSESSMENT DOCD: CPT | Mod: CPTII,S$GLB,, | Performed by: ORTHOPAEDIC SURGERY

## 2020-12-15 PROCEDURE — 99024 POSTOP FOLLOW-UP VISIT: CPT | Mod: S$GLB,,, | Performed by: ORTHOPAEDIC SURGERY

## 2020-12-15 PROCEDURE — 1125F AMNT PAIN NOTED PAIN PRSNT: CPT | Mod: S$GLB,,, | Performed by: ORTHOPAEDIC SURGERY

## 2020-12-15 PROCEDURE — 73630 XR FOOT COMPLETE 3 VIEW LEFT: ICD-10-PCS | Mod: 26,LT,, | Performed by: RADIOLOGY

## 2020-12-15 PROCEDURE — 99024 PR POST-OP FOLLOW-UP VISIT: ICD-10-PCS | Mod: S$GLB,,, | Performed by: ORTHOPAEDIC SURGERY

## 2020-12-15 PROCEDURE — 3008F PR BODY MASS INDEX (BMI) DOCUMENTED: ICD-10-PCS | Mod: CPTII,S$GLB,, | Performed by: ORTHOPAEDIC SURGERY

## 2020-12-15 PROCEDURE — 1101F PR PT FALLS ASSESS DOC 0-1 FALLS W/OUT INJ PAST YR: ICD-10-PCS | Mod: CPTII,S$GLB,, | Performed by: ORTHOPAEDIC SURGERY

## 2020-12-15 RX ORDER — CEPHALEXIN 500 MG/1
500 CAPSULE ORAL EVERY 8 HOURS
Qty: 21 CAPSULE | Refills: 0 | Status: SHIPPED | OUTPATIENT
Start: 2020-12-15 | End: 2020-12-22 | Stop reason: SDUPTHER

## 2020-12-15 RX ORDER — OXYCODONE AND ACETAMINOPHEN 5; 325 MG/1; MG/1
1 TABLET ORAL EVERY 6 HOURS PRN
Qty: 24 TABLET | Refills: 0 | Status: SHIPPED | OUTPATIENT
Start: 2020-12-15 | End: 2020-12-22 | Stop reason: SDUPTHER

## 2020-12-15 NOTE — PROGRESS NOTES
Subjective:      Patient ID: Arleen Oconnell is a 65 y.o. female.    Chief Complaint: Post-op Evaluation of the Left Foot ( Left 2nd toe clawtoe with severe crossover deformity 2) Leftt 2nd metatarsalphalangeal joint dislocation. 2020)    Pt is doing very well today. She rates her pain as 1/10 today.   Social History     Occupational History    Not on file   Tobacco Use    Smoking status: Former Smoker     Types: Cigarettes     Quit date:      Years since quittin.9    Smokeless tobacco: Never Used   Substance and Sexual Activity    Alcohol use: Not Currently     Alcohol/week: 3.0 - 4.0 standard drinks     Types: 3 - 4 Standard drinks or equivalent per week     Comment: rarely    Drug use: Never    Sexual activity: Yes     Partners: Male     Birth control/protection: See Surgical Hx, None            Objective:    Ortho Exam     LLE: neurovascularly intact, she has some purple blistering and bruising of the toe mainly distally. Pin site is Clean, dry, intact. Mild serous drainage from the blisters. No cellulitis.   Sutures are intact.       XRAYS: 3 views of left foot obtained and reviewed today show good correction and alignment.   Assessment:         Medications Ordered This Encounter   Medications    cephALEXin (KEFLEX) 500 MG capsule    oxyCODONE-acetaminophen (PERCOCET) 5-325 mg per tablet       S/p Left clawtoe correction DOS: 20  Plan:       Sutures were removed today .  Weight bearing as tolerated on heel only.   F/u 1 week, no xray.

## 2020-12-22 ENCOUNTER — OFFICE VISIT (OUTPATIENT)
Dept: ORTHOPEDICS | Facility: CLINIC | Age: 65
End: 2020-12-22
Payer: COMMERCIAL

## 2020-12-22 VITALS
DIASTOLIC BLOOD PRESSURE: 87 MMHG | WEIGHT: 158.06 LBS | HEART RATE: 86 BPM | SYSTOLIC BLOOD PRESSURE: 138 MMHG | HEIGHT: 67 IN | BODY MASS INDEX: 24.81 KG/M2

## 2020-12-22 DIAGNOSIS — M20.5X2 CLAWTOE, ACQUIRED, LEFT: ICD-10-CM

## 2020-12-22 DIAGNOSIS — T81.89XD DELAYED SURGICAL WOUND HEALING, SUBSEQUENT ENCOUNTER: Primary | ICD-10-CM

## 2020-12-22 PROBLEM — T81.89XA DELAYED SURGICAL WOUND HEALING: Status: ACTIVE | Noted: 2020-12-22

## 2020-12-22 PROCEDURE — 3008F PR BODY MASS INDEX (BMI) DOCUMENTED: ICD-10-PCS | Mod: CPTII,S$GLB,, | Performed by: ORTHOPAEDIC SURGERY

## 2020-12-22 PROCEDURE — 3288F FALL RISK ASSESSMENT DOCD: CPT | Mod: CPTII,S$GLB,, | Performed by: ORTHOPAEDIC SURGERY

## 2020-12-22 PROCEDURE — 99999 PR PBB SHADOW E&M-EST. PATIENT-LVL III: ICD-10-PCS | Mod: PBBFAC,,, | Performed by: ORTHOPAEDIC SURGERY

## 2020-12-22 PROCEDURE — 99024 POSTOP FOLLOW-UP VISIT: CPT | Mod: S$GLB,,, | Performed by: ORTHOPAEDIC SURGERY

## 2020-12-22 PROCEDURE — 99024 PR POST-OP FOLLOW-UP VISIT: ICD-10-PCS | Mod: S$GLB,,, | Performed by: ORTHOPAEDIC SURGERY

## 2020-12-22 PROCEDURE — 1101F PR PT FALLS ASSESS DOC 0-1 FALLS W/OUT INJ PAST YR: ICD-10-PCS | Mod: CPTII,S$GLB,, | Performed by: ORTHOPAEDIC SURGERY

## 2020-12-22 PROCEDURE — 99999 PR PBB SHADOW E&M-EST. PATIENT-LVL III: CPT | Mod: PBBFAC,,, | Performed by: ORTHOPAEDIC SURGERY

## 2020-12-22 PROCEDURE — 3008F BODY MASS INDEX DOCD: CPT | Mod: CPTII,S$GLB,, | Performed by: ORTHOPAEDIC SURGERY

## 2020-12-22 PROCEDURE — 3288F PR FALLS RISK ASSESSMENT DOCUMENTED: ICD-10-PCS | Mod: CPTII,S$GLB,, | Performed by: ORTHOPAEDIC SURGERY

## 2020-12-22 PROCEDURE — 1101F PT FALLS ASSESS-DOCD LE1/YR: CPT | Mod: CPTII,S$GLB,, | Performed by: ORTHOPAEDIC SURGERY

## 2020-12-22 PROCEDURE — 1125F PR PAIN SEVERITY QUANTIFIED, PAIN PRESENT: ICD-10-PCS | Mod: S$GLB,,, | Performed by: ORTHOPAEDIC SURGERY

## 2020-12-22 PROCEDURE — 1125F AMNT PAIN NOTED PAIN PRSNT: CPT | Mod: S$GLB,,, | Performed by: ORTHOPAEDIC SURGERY

## 2020-12-22 RX ORDER — CEPHALEXIN 500 MG/1
500 CAPSULE ORAL EVERY 8 HOURS
Qty: 21 CAPSULE | Refills: 0 | Status: SHIPPED | OUTPATIENT
Start: 2020-12-22 | End: 2020-12-29 | Stop reason: SDUPTHER

## 2020-12-22 RX ORDER — OXYCODONE AND ACETAMINOPHEN 5; 325 MG/1; MG/1
1 TABLET ORAL EVERY 6 HOURS PRN
Qty: 24 TABLET | Refills: 0 | Status: SHIPPED | OUTPATIENT
Start: 2020-12-22 | End: 2020-12-29 | Stop reason: SDUPTHER

## 2020-12-22 NOTE — PROGRESS NOTES
Subjective:      Patient ID: Arleen Oconnell is a 65 y.o. female.    Chief Complaint: Post-op Evaluation of the Left Foot (DOS 2020 2nd Clawtoe w/Severe crossover deformity, 2nd MTPJ dislocation)    Pt is doing well today. She rates her pain as 3/10 today.   Social History     Occupational History    Not on file   Tobacco Use    Smoking status: Former Smoker     Types: Cigarettes     Quit date:      Years since quittin.9    Smokeless tobacco: Never Used   Substance and Sexual Activity    Alcohol use: Not Currently     Alcohol/week: 3.0 - 4.0 standard drinks     Types: 3 - 4 Standard drinks or equivalent per week     Comment: rarely    Drug use: Never    Sexual activity: Yes     Partners: Male     Birth control/protection: See Surgical Hx, None            Objective:    Ortho Exam     LLE: neurovascularly intact, she skin necrosis toe mainly distally tip of the toe. Pin site is Clean, dry, intact. No drainage today. No cellulitis.   Blisters are healing. She has an approximately 1 cm area of fibrinous tissue at the dorsal PIPJ.       XRAYS: 3 views of left foot obtained and reviewed today show good correction and alignment. There is interval progression of healing.    Assessment:         Medications Ordered This Encounter   Medications    cephALEXin (KEFLEX) 500 MG capsule    oxyCODONE-acetaminophen (PERCOCET) 5-325 mg per tablet       S/p Left clawtoe correction DOS: 20  Plan:       Pin removed today .  Weight bearing as tolerated in darco shoe. I renewed her Kelfex. I am concerned about the tip of the toe and that she may require partial amputation there if it does not heal.   F/u 1 week, with xray of the left foot.

## 2020-12-29 ENCOUNTER — TELEPHONE (OUTPATIENT)
Dept: PODIATRY | Facility: CLINIC | Age: 65
End: 2020-12-29

## 2020-12-29 ENCOUNTER — OFFICE VISIT (OUTPATIENT)
Dept: ORTHOPEDICS | Facility: CLINIC | Age: 65
End: 2020-12-29
Payer: COMMERCIAL

## 2020-12-29 VITALS
HEART RATE: 97 BPM | WEIGHT: 158.06 LBS | SYSTOLIC BLOOD PRESSURE: 132 MMHG | HEIGHT: 67 IN | BODY MASS INDEX: 24.81 KG/M2 | DIASTOLIC BLOOD PRESSURE: 60 MMHG

## 2020-12-29 DIAGNOSIS — M20.5X2 CLAWTOE, ACQUIRED, LEFT: ICD-10-CM

## 2020-12-29 DIAGNOSIS — M20.5X2 CLAW TOE, ACQUIRED, LEFT: ICD-10-CM

## 2020-12-29 DIAGNOSIS — T81.89XD DELAYED SURGICAL WOUND HEALING, SUBSEQUENT ENCOUNTER: Primary | ICD-10-CM

## 2020-12-29 PROCEDURE — 99024 POSTOP FOLLOW-UP VISIT: CPT | Mod: S$GLB,,, | Performed by: ORTHOPAEDIC SURGERY

## 2020-12-29 PROCEDURE — 3008F BODY MASS INDEX DOCD: CPT | Mod: CPTII,S$GLB,, | Performed by: ORTHOPAEDIC SURGERY

## 2020-12-29 PROCEDURE — 99999 PR PBB SHADOW E&M-EST. PATIENT-LVL III: ICD-10-PCS | Mod: PBBFAC,,, | Performed by: ORTHOPAEDIC SURGERY

## 2020-12-29 PROCEDURE — 3288F PR FALLS RISK ASSESSMENT DOCUMENTED: ICD-10-PCS | Mod: CPTII,S$GLB,, | Performed by: ORTHOPAEDIC SURGERY

## 2020-12-29 PROCEDURE — 1101F PT FALLS ASSESS-DOCD LE1/YR: CPT | Mod: CPTII,S$GLB,, | Performed by: ORTHOPAEDIC SURGERY

## 2020-12-29 PROCEDURE — 3008F PR BODY MASS INDEX (BMI) DOCUMENTED: ICD-10-PCS | Mod: CPTII,S$GLB,, | Performed by: ORTHOPAEDIC SURGERY

## 2020-12-29 PROCEDURE — 3288F FALL RISK ASSESSMENT DOCD: CPT | Mod: CPTII,S$GLB,, | Performed by: ORTHOPAEDIC SURGERY

## 2020-12-29 PROCEDURE — 1101F PR PT FALLS ASSESS DOC 0-1 FALLS W/OUT INJ PAST YR: ICD-10-PCS | Mod: CPTII,S$GLB,, | Performed by: ORTHOPAEDIC SURGERY

## 2020-12-29 PROCEDURE — 99024 PR POST-OP FOLLOW-UP VISIT: ICD-10-PCS | Mod: S$GLB,,, | Performed by: ORTHOPAEDIC SURGERY

## 2020-12-29 PROCEDURE — 1125F PR PAIN SEVERITY QUANTIFIED, PAIN PRESENT: ICD-10-PCS | Mod: S$GLB,,, | Performed by: ORTHOPAEDIC SURGERY

## 2020-12-29 PROCEDURE — 99999 PR PBB SHADOW E&M-EST. PATIENT-LVL III: CPT | Mod: PBBFAC,,, | Performed by: ORTHOPAEDIC SURGERY

## 2020-12-29 PROCEDURE — 1125F AMNT PAIN NOTED PAIN PRSNT: CPT | Mod: S$GLB,,, | Performed by: ORTHOPAEDIC SURGERY

## 2020-12-29 RX ORDER — CEPHALEXIN 500 MG/1
500 CAPSULE ORAL EVERY 8 HOURS
Qty: 21 CAPSULE | Refills: 0 | Status: SHIPPED | OUTPATIENT
Start: 2020-12-29 | End: 2021-01-05

## 2020-12-29 RX ORDER — OXYCODONE AND ACETAMINOPHEN 5; 325 MG/1; MG/1
1 TABLET ORAL EVERY 6 HOURS PRN
Qty: 24 TABLET | Refills: 0 | Status: SHIPPED | OUTPATIENT
Start: 2020-12-29 | End: 2021-01-05 | Stop reason: SDUPTHER

## 2020-12-29 NOTE — PROGRESS NOTES
Subjective:      Patient ID: Arleen Oconnell is a 65 y.o. female.    Chief Complaint: Pain and Post-op Evaluation of the Left Foot and Post-op Evaluation    Pt is doing well today. She rates her pain as 2/10 today.   Social History     Occupational History    Not on file   Tobacco Use    Smoking status: Former Smoker     Types: Cigarettes     Quit date:      Years since quittin.0    Smokeless tobacco: Never Used   Substance and Sexual Activity    Alcohol use: Not Currently     Alcohol/week: 3.0 - 4.0 standard drinks     Types: 3 - 4 Standard drinks or equivalent per week     Comment: rarely    Drug use: Never    Sexual activity: Yes     Partners: Male     Birth control/protection: See Surgical Hx, None            Objective:    Ortho Exam     LLE: neurovascularly intact, she skin necrosis at the tip of the  toe mainly distally tip of the toe with some new skin growth and minimal serous drainage.  No cellulitis.   Blisters are healing well. She has an approximately 1 cm area of fibrinous tissue at the dorsal PIPJ.       Assessment:       S/p Left clawtoe correction DOS: 20  With delayed surgical wound healing.   Plan:       I debrided the skin only  at the tip of the toe. I applied luke to the dorsal toe wound and telfa dressing.  I instructed her  on how to change this in 4 days. I will refer her to Dr. Edmondson for evaluation and wound care as well. I  am concerned about the tip of the toe and that she may require partial amputation there if it does not heal. Continue po antibiotics.  F/u 1 week, no xray.

## 2021-01-04 ENCOUNTER — TELEPHONE (OUTPATIENT)
Dept: ORTHOPEDICS | Facility: CLINIC | Age: 66
End: 2021-01-04

## 2021-01-05 ENCOUNTER — OFFICE VISIT (OUTPATIENT)
Dept: ORTHOPEDICS | Facility: CLINIC | Age: 66
End: 2021-01-05
Payer: COMMERCIAL

## 2021-01-05 ENCOUNTER — OFFICE VISIT (OUTPATIENT)
Dept: PODIATRY | Facility: CLINIC | Age: 66
End: 2021-01-05
Payer: MEDICARE

## 2021-01-05 VITALS
WEIGHT: 158 LBS | SYSTOLIC BLOOD PRESSURE: 134 MMHG | DIASTOLIC BLOOD PRESSURE: 69 MMHG | HEIGHT: 67 IN | HEART RATE: 87 BPM | BODY MASS INDEX: 24.8 KG/M2

## 2021-01-05 VITALS — BODY MASS INDEX: 24.81 KG/M2 | HEIGHT: 67 IN | WEIGHT: 158.06 LBS

## 2021-01-05 DIAGNOSIS — T81.31XA DEHISCENCE OF OPERATIVE WOUND, INITIAL ENCOUNTER: ICD-10-CM

## 2021-01-05 DIAGNOSIS — T81.89XA DELAYED SURGICAL WOUND HEALING, INITIAL ENCOUNTER: Primary | ICD-10-CM

## 2021-01-05 DIAGNOSIS — M20.5X2 CLAW TOE, ACQUIRED, LEFT: Primary | ICD-10-CM

## 2021-01-05 DIAGNOSIS — M20.5X2 CLAWTOE, ACQUIRED, LEFT: ICD-10-CM

## 2021-01-05 DIAGNOSIS — I96 GANGRENE OF TOE OF LEFT FOOT: ICD-10-CM

## 2021-01-05 PROCEDURE — 3288F FALL RISK ASSESSMENT DOCD: CPT | Mod: CPTII,S$GLB,, | Performed by: ORTHOPAEDIC SURGERY

## 2021-01-05 PROCEDURE — 1101F PR PT FALLS ASSESS DOC 0-1 FALLS W/OUT INJ PAST YR: ICD-10-PCS | Mod: CPTII,S$GLB,, | Performed by: ORTHOPAEDIC SURGERY

## 2021-01-05 PROCEDURE — 1125F PR PAIN SEVERITY QUANTIFIED, PAIN PRESENT: ICD-10-PCS | Mod: S$GLB,,, | Performed by: ORTHOPAEDIC SURGERY

## 2021-01-05 PROCEDURE — 87075 CULTR BACTERIA EXCEPT BLOOD: CPT

## 2021-01-05 PROCEDURE — 99212 OFFICE O/P EST SF 10 MIN: CPT | Mod: PBBFAC,PN | Performed by: PODIATRIST

## 2021-01-05 PROCEDURE — 99024 PR POST-OP FOLLOW-UP VISIT: ICD-10-PCS | Mod: S$GLB,,, | Performed by: ORTHOPAEDIC SURGERY

## 2021-01-05 PROCEDURE — 1125F AMNT PAIN NOTED PAIN PRSNT: CPT | Mod: S$GLB,,, | Performed by: ORTHOPAEDIC SURGERY

## 2021-01-05 PROCEDURE — 99999 PR PBB SHADOW E&M-EST. PATIENT-LVL III: CPT | Mod: PBBFAC,,, | Performed by: ORTHOPAEDIC SURGERY

## 2021-01-05 PROCEDURE — 99999 PR PBB SHADOW E&M-EST. PATIENT-LVL II: CPT | Mod: PBBFAC,,, | Performed by: PODIATRIST

## 2021-01-05 PROCEDURE — 11042 DBRDMT SUBQ TIS 1ST 20SQCM/<: CPT | Mod: PBBFAC,PN | Performed by: PODIATRIST

## 2021-01-05 PROCEDURE — 87186 SC STD MICRODIL/AGAR DIL: CPT

## 2021-01-05 PROCEDURE — 87070 CULTURE OTHR SPECIMN AEROBIC: CPT

## 2021-01-05 PROCEDURE — 99024 POSTOP FOLLOW-UP VISIT: CPT | Mod: S$GLB,,, | Performed by: ORTHOPAEDIC SURGERY

## 2021-01-05 PROCEDURE — 99203 PR OFFICE/OUTPT VISIT, NEW, LEVL III, 30-44 MIN: ICD-10-PCS | Mod: 25,S$PBB,, | Performed by: PODIATRIST

## 2021-01-05 PROCEDURE — 99203 OFFICE O/P NEW LOW 30 MIN: CPT | Mod: 25,S$PBB,, | Performed by: PODIATRIST

## 2021-01-05 PROCEDURE — 3008F PR BODY MASS INDEX (BMI) DOCUMENTED: ICD-10-PCS | Mod: CPTII,S$GLB,, | Performed by: ORTHOPAEDIC SURGERY

## 2021-01-05 PROCEDURE — 3288F PR FALLS RISK ASSESSMENT DOCUMENTED: ICD-10-PCS | Mod: CPTII,S$GLB,, | Performed by: ORTHOPAEDIC SURGERY

## 2021-01-05 PROCEDURE — 11042 WOUND DEBRIDEMENT: ICD-10-PCS | Mod: S$PBB,,, | Performed by: PODIATRIST

## 2021-01-05 PROCEDURE — 99999 PR PBB SHADOW E&M-EST. PATIENT-LVL III: ICD-10-PCS | Mod: PBBFAC,,, | Performed by: ORTHOPAEDIC SURGERY

## 2021-01-05 PROCEDURE — 1101F PT FALLS ASSESS-DOCD LE1/YR: CPT | Mod: CPTII,S$GLB,, | Performed by: ORTHOPAEDIC SURGERY

## 2021-01-05 PROCEDURE — 87077 CULTURE AEROBIC IDENTIFY: CPT | Mod: 59

## 2021-01-05 PROCEDURE — 3008F BODY MASS INDEX DOCD: CPT | Mod: CPTII,S$GLB,, | Performed by: ORTHOPAEDIC SURGERY

## 2021-01-05 PROCEDURE — 99999 PR PBB SHADOW E&M-EST. PATIENT-LVL II: ICD-10-PCS | Mod: PBBFAC,,, | Performed by: PODIATRIST

## 2021-01-05 RX ORDER — OXYCODONE AND ACETAMINOPHEN 5; 325 MG/1; MG/1
1 TABLET ORAL EVERY 6 HOURS PRN
Qty: 28 TABLET | Refills: 0 | Status: SHIPPED | OUTPATIENT
Start: 2021-01-05 | End: 2021-01-19 | Stop reason: SDUPTHER

## 2021-01-09 DIAGNOSIS — L08.9 INFECTION OF TOE: Primary | ICD-10-CM

## 2021-01-09 LAB
BACTERIA SPEC AEROBE CULT: ABNORMAL
BACTERIA SPEC AEROBE CULT: ABNORMAL

## 2021-01-09 RX ORDER — SULFAMETHOXAZOLE AND TRIMETHOPRIM 800; 160 MG/1; MG/1
1 TABLET ORAL 2 TIMES DAILY
Qty: 14 TABLET | Refills: 0 | Status: SHIPPED | OUTPATIENT
Start: 2021-01-09 | End: 2021-03-08 | Stop reason: ALTCHOICE

## 2021-01-11 LAB — BACTERIA SPEC ANAEROBE CULT: NORMAL

## 2021-01-13 ENCOUNTER — OFFICE VISIT (OUTPATIENT)
Dept: PODIATRY | Facility: CLINIC | Age: 66
End: 2021-01-13
Payer: MEDICARE

## 2021-01-13 VITALS — BODY MASS INDEX: 24.81 KG/M2 | HEIGHT: 67 IN | WEIGHT: 158.06 LBS

## 2021-01-13 DIAGNOSIS — T81.31XD DEHISCENCE OF OPERATIVE WOUND, SUBSEQUENT ENCOUNTER: Primary | ICD-10-CM

## 2021-01-13 DIAGNOSIS — I96 GANGRENE OF TOE OF LEFT FOOT: ICD-10-CM

## 2021-01-13 PROCEDURE — 99212 OFFICE O/P EST SF 10 MIN: CPT | Mod: PBBFAC,PN,25 | Performed by: PODIATRIST

## 2021-01-13 PROCEDURE — 99499 UNLISTED E&M SERVICE: CPT | Mod: S$PBB,,, | Performed by: PODIATRIST

## 2021-01-13 PROCEDURE — 99999 PR PBB SHADOW E&M-EST. PATIENT-LVL II: ICD-10-PCS | Mod: PBBFAC,,, | Performed by: PODIATRIST

## 2021-01-13 PROCEDURE — 11042 DBRDMT SUBQ TIS 1ST 20SQCM/<: CPT | Mod: PBBFAC,PN | Performed by: PODIATRIST

## 2021-01-13 PROCEDURE — 99499 NO LOS: ICD-10-PCS | Mod: S$PBB,,, | Performed by: PODIATRIST

## 2021-01-13 PROCEDURE — 99999 PR PBB SHADOW E&M-EST. PATIENT-LVL II: CPT | Mod: PBBFAC,,, | Performed by: PODIATRIST

## 2021-01-13 PROCEDURE — 11042 WOUND DEBRIDEMENT: ICD-10-PCS | Mod: S$PBB,,, | Performed by: PODIATRIST

## 2021-01-19 ENCOUNTER — OFFICE VISIT (OUTPATIENT)
Dept: PODIATRY | Facility: CLINIC | Age: 66
End: 2021-01-19
Payer: MEDICARE

## 2021-01-19 VITALS — HEIGHT: 67 IN | WEIGHT: 158.06 LBS | BODY MASS INDEX: 24.81 KG/M2

## 2021-01-19 DIAGNOSIS — I96 GANGRENE OF TOE OF LEFT FOOT: ICD-10-CM

## 2021-01-19 DIAGNOSIS — T81.31XD DEHISCENCE OF OPERATIVE WOUND, SUBSEQUENT ENCOUNTER: Primary | ICD-10-CM

## 2021-01-19 PROCEDURE — 11042 WOUND DEBRIDEMENT: ICD-10-PCS | Mod: S$PBB,,, | Performed by: PODIATRIST

## 2021-01-19 PROCEDURE — 99999 PR PBB SHADOW E&M-EST. PATIENT-LVL II: ICD-10-PCS | Mod: PBBFAC,,, | Performed by: PODIATRIST

## 2021-01-19 PROCEDURE — 99212 OFFICE O/P EST SF 10 MIN: CPT | Mod: PBBFAC,PN | Performed by: PODIATRIST

## 2021-01-19 PROCEDURE — 99499 UNLISTED E&M SERVICE: CPT | Mod: S$PBB,,, | Performed by: PODIATRIST

## 2021-01-19 PROCEDURE — 11042 DBRDMT SUBQ TIS 1ST 20SQCM/<: CPT | Mod: PBBFAC,PN | Performed by: PODIATRIST

## 2021-01-19 PROCEDURE — 99499 NO LOS: ICD-10-PCS | Mod: S$PBB,,, | Performed by: PODIATRIST

## 2021-01-19 PROCEDURE — 99999 PR PBB SHADOW E&M-EST. PATIENT-LVL II: CPT | Mod: PBBFAC,,, | Performed by: PODIATRIST

## 2021-01-19 RX ORDER — OXYCODONE AND ACETAMINOPHEN 5; 325 MG/1; MG/1
1 TABLET ORAL EVERY 6 HOURS PRN
Qty: 28 TABLET | Refills: 0 | Status: SHIPPED | OUTPATIENT
Start: 2021-01-19 | End: 2021-02-03 | Stop reason: SDUPTHER

## 2021-01-22 ENCOUNTER — TELEPHONE (OUTPATIENT)
Dept: PODIATRY | Facility: CLINIC | Age: 66
End: 2021-01-22

## 2021-01-25 ENCOUNTER — CLINICAL SUPPORT (OUTPATIENT)
Dept: URGENT CARE | Facility: CLINIC | Age: 66
End: 2021-01-25
Payer: MEDICARE

## 2021-01-25 ENCOUNTER — TELEPHONE (OUTPATIENT)
Dept: PODIATRY | Facility: CLINIC | Age: 66
End: 2021-01-25

## 2021-01-25 DIAGNOSIS — U07.1 COVID-19 VIRUS DETECTED: ICD-10-CM

## 2021-01-25 DIAGNOSIS — Z11.52 ENCOUNTER FOR SCREENING LABORATORY TESTING FOR COVID-19 VIRUS: Primary | ICD-10-CM

## 2021-01-25 LAB
CTP QC/QA: YES
SARS-COV-2 RDRP RESP QL NAA+PROBE: POSITIVE

## 2021-01-25 PROCEDURE — U0002 COVID-19 LAB TEST NON-CDC: HCPCS | Mod: QW,CR,S$GLB, | Performed by: FAMILY MEDICINE

## 2021-01-25 PROCEDURE — U0002: ICD-10-PCS | Mod: QW,CR,S$GLB, | Performed by: FAMILY MEDICINE

## 2021-01-26 ENCOUNTER — TELEPHONE (OUTPATIENT)
Dept: PODIATRY | Facility: CLINIC | Age: 66
End: 2021-01-26

## 2021-01-26 ENCOUNTER — PATIENT MESSAGE (OUTPATIENT)
Dept: PODIATRY | Facility: CLINIC | Age: 66
End: 2021-01-26

## 2021-01-29 ENCOUNTER — PATIENT MESSAGE (OUTPATIENT)
Dept: PODIATRY | Facility: CLINIC | Age: 66
End: 2021-01-29

## 2021-02-02 ENCOUNTER — PATIENT MESSAGE (OUTPATIENT)
Dept: PODIATRY | Facility: CLINIC | Age: 66
End: 2021-02-02

## 2021-02-03 ENCOUNTER — OFFICE VISIT (OUTPATIENT)
Dept: PODIATRY | Facility: CLINIC | Age: 66
End: 2021-02-03
Payer: MEDICARE

## 2021-02-03 VITALS — WEIGHT: 158.06 LBS | BODY MASS INDEX: 24.81 KG/M2 | HEIGHT: 67 IN

## 2021-02-03 DIAGNOSIS — T81.31XD DEHISCENCE OF OPERATIVE WOUND, SUBSEQUENT ENCOUNTER: Primary | ICD-10-CM

## 2021-02-03 DIAGNOSIS — M20.5X2 CLAW TOE, ACQUIRED, LEFT: Primary | ICD-10-CM

## 2021-02-03 DIAGNOSIS — I96 GANGRENE OF TOE OF LEFT FOOT: ICD-10-CM

## 2021-02-03 PROCEDURE — 11042 WOUND DEBRIDEMENT: ICD-10-PCS | Mod: S$PBB,,, | Performed by: PODIATRIST

## 2021-02-03 PROCEDURE — 99499 NO LOS: ICD-10-PCS | Mod: S$PBB,,, | Performed by: PODIATRIST

## 2021-02-03 PROCEDURE — 99212 OFFICE O/P EST SF 10 MIN: CPT | Mod: PBBFAC,PN | Performed by: PODIATRIST

## 2021-02-03 PROCEDURE — 99999 PR PBB SHADOW E&M-EST. PATIENT-LVL II: ICD-10-PCS | Mod: PBBFAC,,, | Performed by: PODIATRIST

## 2021-02-03 PROCEDURE — 99499 UNLISTED E&M SERVICE: CPT | Mod: S$PBB,,, | Performed by: PODIATRIST

## 2021-02-03 PROCEDURE — 11042 DBRDMT SUBQ TIS 1ST 20SQCM/<: CPT | Mod: PBBFAC,PN | Performed by: PODIATRIST

## 2021-02-03 PROCEDURE — 99999 PR PBB SHADOW E&M-EST. PATIENT-LVL II: CPT | Mod: PBBFAC,,, | Performed by: PODIATRIST

## 2021-02-03 RX ORDER — OXYCODONE AND ACETAMINOPHEN 5; 325 MG/1; MG/1
1 TABLET ORAL EVERY 6 HOURS PRN
Qty: 28 TABLET | Refills: 0 | Status: SHIPPED | OUTPATIENT
Start: 2021-02-03 | End: 2021-02-08 | Stop reason: SDUPTHER

## 2021-02-08 ENCOUNTER — OFFICE VISIT (OUTPATIENT)
Dept: PODIATRY | Facility: CLINIC | Age: 66
End: 2021-02-08
Payer: MEDICARE

## 2021-02-08 ENCOUNTER — HOSPITAL ENCOUNTER (OUTPATIENT)
Dept: RADIOLOGY | Facility: HOSPITAL | Age: 66
Discharge: HOME | End: 2021-02-08
Attending: ORTHOPAEDIC SURGERY
Payer: MEDICARE

## 2021-02-08 ENCOUNTER — OFFICE VISIT (OUTPATIENT)
Dept: ORTHOPEDICS | Facility: CLINIC | Age: 66
End: 2021-02-08
Payer: MEDICARE

## 2021-02-08 VITALS — BODY MASS INDEX: 24.81 KG/M2 | HEIGHT: 67 IN | WEIGHT: 158.06 LBS

## 2021-02-08 VITALS
BODY MASS INDEX: 24.81 KG/M2 | WEIGHT: 158.06 LBS | HEIGHT: 67 IN | DIASTOLIC BLOOD PRESSURE: 77 MMHG | SYSTOLIC BLOOD PRESSURE: 137 MMHG | HEART RATE: 78 BPM

## 2021-02-08 DIAGNOSIS — T81.89XD DELAYED SURGICAL WOUND HEALING, SUBSEQUENT ENCOUNTER: Primary | ICD-10-CM

## 2021-02-08 DIAGNOSIS — I96 GANGRENE OF TOE OF LEFT FOOT: ICD-10-CM

## 2021-02-08 DIAGNOSIS — T81.31XD DEHISCENCE OF OPERATIVE WOUND, SUBSEQUENT ENCOUNTER: ICD-10-CM

## 2021-02-08 DIAGNOSIS — M20.5X2 CLAW TOE, ACQUIRED, LEFT: ICD-10-CM

## 2021-02-08 PROCEDURE — 99024 POSTOP FOLLOW-UP VISIT: CPT | Mod: POP,,, | Performed by: ORTHOPAEDIC SURGERY

## 2021-02-08 PROCEDURE — 73630 XR FOOT COMPLETE 3 VIEW LEFT: ICD-10-PCS | Mod: 26,LT,, | Performed by: RADIOLOGY

## 2021-02-08 PROCEDURE — 99499 UNLISTED E&M SERVICE: CPT | Mod: S$PBB,,, | Performed by: PODIATRIST

## 2021-02-08 PROCEDURE — 73630 X-RAY EXAM OF FOOT: CPT | Mod: 26,LT,, | Performed by: RADIOLOGY

## 2021-02-08 PROCEDURE — 11042 DBRDMT SUBQ TIS 1ST 20SQCM/<: CPT | Mod: 59,S$PBB,, | Performed by: PODIATRIST

## 2021-02-08 PROCEDURE — 99212 OFFICE O/P EST SF 10 MIN: CPT | Mod: PBBFAC,25,PN | Performed by: PODIATRIST

## 2021-02-08 PROCEDURE — 99999 PR PBB SHADOW E&M-EST. PATIENT-LVL IV: CPT | Mod: PBBFAC,,, | Performed by: ORTHOPAEDIC SURGERY

## 2021-02-08 PROCEDURE — 99499 NO LOS: ICD-10-PCS | Mod: S$PBB,,, | Performed by: PODIATRIST

## 2021-02-08 PROCEDURE — 99214 OFFICE O/P EST MOD 30 MIN: CPT | Mod: PBBFAC,25,27,PN | Performed by: ORTHOPAEDIC SURGERY

## 2021-02-08 PROCEDURE — 11044 DBRDMT BONE 1ST 20 SQ CM/<: CPT | Mod: PBBFAC,PN | Performed by: PODIATRIST

## 2021-02-08 PROCEDURE — 99999 PR PBB SHADOW E&M-EST. PATIENT-LVL II: ICD-10-PCS | Mod: PBBFAC,,, | Performed by: PODIATRIST

## 2021-02-08 PROCEDURE — 11044 WOUND DEBRIDEMENT: ICD-10-PCS | Mod: S$PBB,,, | Performed by: PODIATRIST

## 2021-02-08 PROCEDURE — 99999 PR PBB SHADOW E&M-EST. PATIENT-LVL IV: ICD-10-PCS | Mod: PBBFAC,,, | Performed by: ORTHOPAEDIC SURGERY

## 2021-02-08 PROCEDURE — 73630 X-RAY EXAM OF FOOT: CPT | Mod: TC,PO,LT

## 2021-02-08 PROCEDURE — 99024 PR POST-OP FOLLOW-UP VISIT: ICD-10-PCS | Mod: POP,,, | Performed by: ORTHOPAEDIC SURGERY

## 2021-02-08 PROCEDURE — 11042 WOUND DEBRIDEMENT: ICD-10-PCS | Mod: 59,S$PBB,, | Performed by: PODIATRIST

## 2021-02-08 PROCEDURE — 99999 PR PBB SHADOW E&M-EST. PATIENT-LVL II: CPT | Mod: PBBFAC,,, | Performed by: PODIATRIST

## 2021-02-08 RX ORDER — OXYCODONE AND ACETAMINOPHEN 5; 325 MG/1; MG/1
1 TABLET ORAL EVERY 6 HOURS PRN
Qty: 28 TABLET | Refills: 0 | Status: SHIPPED | OUTPATIENT
Start: 2021-02-08 | End: 2021-02-23 | Stop reason: SDUPTHER

## 2021-02-17 ENCOUNTER — OFFICE VISIT (OUTPATIENT)
Dept: PODIATRY | Facility: CLINIC | Age: 66
End: 2021-02-17
Payer: MEDICARE

## 2021-02-17 DIAGNOSIS — T81.31XD DEHISCENCE OF OPERATIVE WOUND, SUBSEQUENT ENCOUNTER: Primary | ICD-10-CM

## 2021-02-17 PROCEDURE — 99499 UNLISTED E&M SERVICE: CPT | Mod: S$PBB,,, | Performed by: PODIATRIST

## 2021-02-17 PROCEDURE — 11042 DBRDMT SUBQ TIS 1ST 20SQCM/<: CPT | Mod: PBBFAC,PN | Performed by: PODIATRIST

## 2021-02-17 PROCEDURE — 11042 WOUND DEBRIDEMENT: ICD-10-PCS | Mod: S$PBB,,, | Performed by: PODIATRIST

## 2021-02-17 PROCEDURE — 99499 NO LOS: ICD-10-PCS | Mod: S$PBB,,, | Performed by: PODIATRIST

## 2021-02-23 ENCOUNTER — OFFICE VISIT (OUTPATIENT)
Dept: PODIATRY | Facility: CLINIC | Age: 66
End: 2021-02-23
Payer: MEDICARE

## 2021-02-23 VITALS — WEIGHT: 158.06 LBS | BODY MASS INDEX: 24.81 KG/M2 | RESPIRATION RATE: 18 BRPM | HEIGHT: 67 IN

## 2021-02-23 DIAGNOSIS — T81.31XD DEHISCENCE OF OPERATIVE WOUND, SUBSEQUENT ENCOUNTER: Primary | ICD-10-CM

## 2021-02-23 DIAGNOSIS — M77.42 METATARSALGIA OF LEFT FOOT: ICD-10-CM

## 2021-02-23 PROCEDURE — 99214 OFFICE O/P EST MOD 30 MIN: CPT | Mod: PBBFAC,PN,25 | Performed by: PODIATRIST

## 2021-02-23 PROCEDURE — 97597 DBRDMT OPN WND 1ST 20 CM/<: CPT | Mod: PBBFAC,PN | Performed by: PODIATRIST

## 2021-02-23 PROCEDURE — 97597 WOUND DEBRIDEMENT: ICD-10-PCS | Mod: S$PBB,,, | Performed by: PODIATRIST

## 2021-02-23 PROCEDURE — 99499 NO LOS: ICD-10-PCS | Mod: S$PBB,,, | Performed by: PODIATRIST

## 2021-02-23 PROCEDURE — 99499 UNLISTED E&M SERVICE: CPT | Mod: S$PBB,,, | Performed by: PODIATRIST

## 2021-02-23 PROCEDURE — 99999 PR PBB SHADOW E&M-EST. PATIENT-LVL IV: ICD-10-PCS | Mod: PBBFAC,,, | Performed by: PODIATRIST

## 2021-02-23 PROCEDURE — 99999 PR PBB SHADOW E&M-EST. PATIENT-LVL IV: CPT | Mod: PBBFAC,,, | Performed by: PODIATRIST

## 2021-02-23 RX ORDER — OXYCODONE AND ACETAMINOPHEN 5; 325 MG/1; MG/1
1 TABLET ORAL EVERY 6 HOURS PRN
Qty: 28 TABLET | Refills: 0 | Status: SHIPPED | OUTPATIENT
Start: 2021-02-23 | End: 2021-03-08 | Stop reason: SDUPTHER

## 2021-03-02 ENCOUNTER — OFFICE VISIT (OUTPATIENT)
Dept: PODIATRY | Facility: CLINIC | Age: 66
End: 2021-03-02
Payer: MEDICARE

## 2021-03-02 DIAGNOSIS — T81.31XD DEHISCENCE OF OPERATIVE WOUND, SUBSEQUENT ENCOUNTER: Primary | ICD-10-CM

## 2021-03-02 PROCEDURE — 99499 NO LOS: ICD-10-PCS | Mod: S$PBB,,, | Performed by: PODIATRIST

## 2021-03-02 PROCEDURE — 99499 UNLISTED E&M SERVICE: CPT | Mod: S$PBB,,, | Performed by: PODIATRIST

## 2021-03-02 PROCEDURE — 97597 WOUND DEBRIDEMENT: ICD-10-PCS | Mod: S$PBB,,, | Performed by: PODIATRIST

## 2021-03-02 PROCEDURE — 97597 DBRDMT OPN WND 1ST 20 CM/<: CPT | Mod: PBBFAC,PN | Performed by: PODIATRIST

## 2021-03-03 DIAGNOSIS — T81.89XD DELAYED SURGICAL WOUND HEALING, SUBSEQUENT ENCOUNTER: Primary | ICD-10-CM

## 2021-03-03 DIAGNOSIS — M20.5X2 CLAW TOE, ACQUIRED, LEFT: ICD-10-CM

## 2021-03-03 DIAGNOSIS — M79.672 PAIN IN LEFT FOOT: ICD-10-CM

## 2021-03-08 ENCOUNTER — HOSPITAL ENCOUNTER (OUTPATIENT)
Dept: RADIOLOGY | Facility: HOSPITAL | Age: 66
Discharge: HOME | End: 2021-03-08
Attending: ORTHOPAEDIC SURGERY
Payer: MEDICARE

## 2021-03-08 ENCOUNTER — OFFICE VISIT (OUTPATIENT)
Dept: ORTHOPEDICS | Facility: CLINIC | Age: 66
End: 2021-03-08
Payer: MEDICARE

## 2021-03-08 ENCOUNTER — OFFICE VISIT (OUTPATIENT)
Dept: PODIATRY | Facility: CLINIC | Age: 66
End: 2021-03-08
Payer: MEDICARE

## 2021-03-08 VITALS
HEART RATE: 88 BPM | WEIGHT: 158.06 LBS | SYSTOLIC BLOOD PRESSURE: 136 MMHG | DIASTOLIC BLOOD PRESSURE: 69 MMHG | HEIGHT: 67 IN | BODY MASS INDEX: 24.81 KG/M2

## 2021-03-08 VITALS — WEIGHT: 158.06 LBS | BODY MASS INDEX: 24.81 KG/M2 | HEIGHT: 67 IN

## 2021-03-08 DIAGNOSIS — T81.89XD DELAYED SURGICAL WOUND HEALING, SUBSEQUENT ENCOUNTER: Primary | ICD-10-CM

## 2021-03-08 DIAGNOSIS — M20.5X2 CLAW TOE, ACQUIRED, LEFT: ICD-10-CM

## 2021-03-08 DIAGNOSIS — T81.31XD DEHISCENCE OF OPERATIVE WOUND, SUBSEQUENT ENCOUNTER: ICD-10-CM

## 2021-03-08 DIAGNOSIS — M79.672 PAIN IN LEFT FOOT: ICD-10-CM

## 2021-03-08 PROCEDURE — 73630 XR FOOT COMPLETE 3 VIEW LEFT: ICD-10-PCS | Mod: 26,LT,, | Performed by: RADIOLOGY

## 2021-03-08 PROCEDURE — 99214 OFFICE O/P EST MOD 30 MIN: CPT | Mod: PBBFAC,25,PN | Performed by: ORTHOPAEDIC SURGERY

## 2021-03-08 PROCEDURE — 99999 PR PBB SHADOW E&M-EST. PATIENT-LVL IV: CPT | Mod: PBBFAC,,, | Performed by: ORTHOPAEDIC SURGERY

## 2021-03-08 PROCEDURE — 99214 OFFICE O/P EST MOD 30 MIN: CPT | Mod: S$PBB,,, | Performed by: ORTHOPAEDIC SURGERY

## 2021-03-08 PROCEDURE — 99999 PR PBB SHADOW E&M-EST. PATIENT-LVL II: CPT | Mod: PBBFAC,,, | Performed by: PODIATRIST

## 2021-03-08 PROCEDURE — 99212 OFFICE O/P EST SF 10 MIN: CPT | Mod: S$PBB,,, | Performed by: PODIATRIST

## 2021-03-08 PROCEDURE — 99999 PR PBB SHADOW E&M-EST. PATIENT-LVL IV: ICD-10-PCS | Mod: PBBFAC,,, | Performed by: ORTHOPAEDIC SURGERY

## 2021-03-08 PROCEDURE — 99999 PR PBB SHADOW E&M-EST. PATIENT-LVL II: ICD-10-PCS | Mod: PBBFAC,,, | Performed by: PODIATRIST

## 2021-03-08 PROCEDURE — 73630 X-RAY EXAM OF FOOT: CPT | Mod: 26,LT,, | Performed by: RADIOLOGY

## 2021-03-08 PROCEDURE — 99214 PR OFFICE/OUTPT VISIT, EST, LEVL IV, 30-39 MIN: ICD-10-PCS | Mod: S$PBB,,, | Performed by: ORTHOPAEDIC SURGERY

## 2021-03-08 PROCEDURE — 99212 OFFICE O/P EST SF 10 MIN: CPT | Mod: PBBFAC,25,27,PN | Performed by: PODIATRIST

## 2021-03-08 PROCEDURE — 99212 PR OFFICE/OUTPT VISIT, EST, LEVL II, 10-19 MIN: ICD-10-PCS | Mod: S$PBB,,, | Performed by: PODIATRIST

## 2021-03-08 PROCEDURE — 73630 X-RAY EXAM OF FOOT: CPT | Mod: TC,PO,LT

## 2021-03-08 RX ORDER — OXYCODONE AND ACETAMINOPHEN 5; 325 MG/1; MG/1
1 TABLET ORAL EVERY 12 HOURS PRN
Qty: 14 TABLET | Refills: 0 | Status: SHIPPED | OUTPATIENT
Start: 2021-03-08 | End: 2023-01-04

## 2021-03-15 ENCOUNTER — OFFICE VISIT (OUTPATIENT)
Dept: PODIATRY | Facility: CLINIC | Age: 66
End: 2021-03-15
Payer: MEDICARE

## 2021-03-15 ENCOUNTER — PATIENT MESSAGE (OUTPATIENT)
Dept: PODIATRY | Facility: CLINIC | Age: 66
End: 2021-03-15

## 2021-03-15 VITALS — WEIGHT: 158.06 LBS | HEIGHT: 67 IN | BODY MASS INDEX: 24.81 KG/M2

## 2021-03-15 DIAGNOSIS — T81.31XD DEHISCENCE OF OPERATIVE WOUND, SUBSEQUENT ENCOUNTER: Primary | ICD-10-CM

## 2021-03-15 PROCEDURE — 99212 OFFICE O/P EST SF 10 MIN: CPT | Mod: PBBFAC,PN | Performed by: PODIATRIST

## 2021-03-15 PROCEDURE — 99212 PR OFFICE/OUTPT VISIT, EST, LEVL II, 10-19 MIN: ICD-10-PCS | Mod: S$PBB,,, | Performed by: PODIATRIST

## 2021-03-15 PROCEDURE — 99999 PR PBB SHADOW E&M-EST. PATIENT-LVL II: ICD-10-PCS | Mod: PBBFAC,,, | Performed by: PODIATRIST

## 2021-03-15 PROCEDURE — 99999 PR PBB SHADOW E&M-EST. PATIENT-LVL II: CPT | Mod: PBBFAC,,, | Performed by: PODIATRIST

## 2021-03-15 PROCEDURE — 99212 OFFICE O/P EST SF 10 MIN: CPT | Mod: S$PBB,,, | Performed by: PODIATRIST

## 2021-03-26 ENCOUNTER — IMMUNIZATION (OUTPATIENT)
Dept: PRIMARY CARE CLINIC | Facility: CLINIC | Age: 66
End: 2021-03-26
Payer: MEDICARE

## 2021-03-26 DIAGNOSIS — Z23 NEED FOR VACCINATION: Primary | ICD-10-CM

## 2021-03-26 PROCEDURE — 91300 COVID-19, MRNA, LNP-S, PF, 30 MCG/0.3 ML DOSE VACCINE: ICD-10-PCS | Mod: S$GLB,,, | Performed by: FAMILY MEDICINE

## 2021-03-26 PROCEDURE — 0001A COVID-19, MRNA, LNP-S, PF, 30 MCG/0.3 ML DOSE VACCINE: CPT | Mod: CV19,S$GLB,, | Performed by: FAMILY MEDICINE

## 2021-03-26 PROCEDURE — 0001A COVID-19, MRNA, LNP-S, PF, 30 MCG/0.3 ML DOSE VACCINE: ICD-10-PCS | Mod: CV19,S$GLB,, | Performed by: FAMILY MEDICINE

## 2021-03-26 PROCEDURE — 91300 COVID-19, MRNA, LNP-S, PF, 30 MCG/0.3 ML DOSE VACCINE: CPT | Mod: S$GLB,,, | Performed by: FAMILY MEDICINE

## 2021-04-07 DIAGNOSIS — M79.672 PAIN IN LEFT FOOT: Primary | ICD-10-CM

## 2021-04-12 ENCOUNTER — TELEPHONE (OUTPATIENT)
Dept: ORTHOPEDICS | Facility: CLINIC | Age: 66
End: 2021-04-12

## 2021-04-16 ENCOUNTER — IMMUNIZATION (OUTPATIENT)
Dept: PRIMARY CARE CLINIC | Facility: CLINIC | Age: 66
End: 2021-04-16
Payer: MEDICARE

## 2021-04-16 DIAGNOSIS — Z23 NEED FOR VACCINATION: Primary | ICD-10-CM

## 2021-04-16 PROCEDURE — 0002A COVID-19, MRNA, LNP-S, PF, 30 MCG/0.3 ML DOSE VACCINE: CPT | Mod: CV19,S$GLB,, | Performed by: FAMILY MEDICINE

## 2021-04-16 PROCEDURE — 91300 COVID-19, MRNA, LNP-S, PF, 30 MCG/0.3 ML DOSE VACCINE: CPT | Mod: S$GLB,,, | Performed by: FAMILY MEDICINE

## 2021-04-16 PROCEDURE — 91300 COVID-19, MRNA, LNP-S, PF, 30 MCG/0.3 ML DOSE VACCINE: ICD-10-PCS | Mod: S$GLB,,, | Performed by: FAMILY MEDICINE

## 2021-04-16 PROCEDURE — 0002A COVID-19, MRNA, LNP-S, PF, 30 MCG/0.3 ML DOSE VACCINE: ICD-10-PCS | Mod: CV19,S$GLB,, | Performed by: FAMILY MEDICINE

## 2021-04-21 ENCOUNTER — OFFICE VISIT (OUTPATIENT)
Dept: PODIATRY | Facility: CLINIC | Age: 66
End: 2021-04-21
Payer: MEDICARE

## 2021-04-21 VITALS — WEIGHT: 158.06 LBS | HEIGHT: 67 IN | BODY MASS INDEX: 24.81 KG/M2

## 2021-04-21 DIAGNOSIS — T81.31XD DEHISCENCE OF OPERATIVE WOUND, SUBSEQUENT ENCOUNTER: Primary | ICD-10-CM

## 2021-04-21 DIAGNOSIS — R20.2 PARESTHESIA OF LEFT FOOT: ICD-10-CM

## 2021-04-21 PROCEDURE — 99212 PR OFFICE/OUTPT VISIT, EST, LEVL II, 10-19 MIN: ICD-10-PCS | Mod: S$PBB,,, | Performed by: PODIATRIST

## 2021-04-21 PROCEDURE — 99999 PR PBB SHADOW E&M-EST. PATIENT-LVL III: CPT | Mod: PBBFAC,,, | Performed by: PODIATRIST

## 2021-04-21 PROCEDURE — 99213 OFFICE O/P EST LOW 20 MIN: CPT | Mod: PBBFAC,PN | Performed by: PODIATRIST

## 2021-04-21 PROCEDURE — 99999 PR PBB SHADOW E&M-EST. PATIENT-LVL III: ICD-10-PCS | Mod: PBBFAC,,, | Performed by: PODIATRIST

## 2021-04-21 PROCEDURE — 99212 OFFICE O/P EST SF 10 MIN: CPT | Mod: S$PBB,,, | Performed by: PODIATRIST

## 2021-04-21 RX ORDER — HYDROCODONE BITARTRATE AND ACETAMINOPHEN 5; 325 MG/1; MG/1
1 TABLET ORAL EVERY 4 HOURS PRN
COMMUNITY
Start: 2021-04-02 | End: 2023-01-04

## 2021-04-21 RX ORDER — IPRATROPIUM BROMIDE 42 UG/1
SPRAY, METERED NASAL
COMMUNITY
Start: 2021-04-02 | End: 2023-01-04

## 2021-04-21 RX ORDER — FLUCONAZOLE 150 MG/1
150 TABLET ORAL
COMMUNITY
Start: 2021-04-02 | End: 2023-11-08 | Stop reason: ALTCHOICE

## 2021-04-21 RX ORDER — GABAPENTIN 300 MG/1
300 CAPSULE ORAL DAILY
Qty: 30 CAPSULE | Refills: 11 | Status: SHIPPED | OUTPATIENT
Start: 2021-04-21 | End: 2022-02-28

## 2021-04-21 RX ORDER — PREDNISONE 10 MG/1
TABLET ORAL
COMMUNITY
Start: 2021-04-02 | End: 2022-05-18 | Stop reason: ALTCHOICE

## 2021-04-21 RX ORDER — ALBUTEROL SULFATE 90 UG/1
2 AEROSOL, METERED RESPIRATORY (INHALATION) 4 TIMES DAILY
COMMUNITY
Start: 2021-04-02

## 2021-04-21 RX ORDER — DOXYCYCLINE 100 MG/1
100 CAPSULE ORAL 2 TIMES DAILY
COMMUNITY
Start: 2021-04-02 | End: 2022-05-18

## 2021-11-17 NOTE — ANESTHESIA PREPROCEDURE EVALUATION
11/30/2020  Arleen Oconnell is a 65 y.o., female.    Pre-op Assessment    I have reviewed the Patient Summary Reports.     I have reviewed the Nursing Notes. I have reviewed the NPO Status.   I have reviewed the Medications.     Review of Systems  Anesthesia Hx:  No problems with previous Anesthesia  Neg history of prior surgery. Denies Family Hx of Anesthesia complications.   Denies Personal Hx of Anesthesia complications.   Social:  Former Smoker    Hematology/Oncology:  Hematology Normal   Oncology Normal     EENT/Dental:EENT/Dental Normal   Cardiovascular:   Hypertension, well controlled ECG has been reviewed.    Pulmonary:   Asthma    Renal/:  Renal/ Normal     Hepatic/GI:   GERD, well controlled    Musculoskeletal:  Musculoskeletal Normal Fibromyalgia   Neurological:   Headaches migraines   Endocrine:  Endocrine Normal    Dermatological:  Skin Normal    Psych:   anxiety depression          Physical Exam  General:  Well nourished    Airway/Jaw/Neck:  Airway Findings: Mouth Opening: Normal Tongue: Normal  Mallampati: I  TM Distance: Normal, at least 6 cm  Jaw/Neck Findings:  Neck ROM: Normal ROM, Normal Extension, Painful  Neck Findings:      Dental:  Dental Findings: In tact   Chest/Lungs:  Chest/Lungs Findings: Clear to auscultation, Normal Respiratory Rate     Heart/Vascular:  Heart Findings: Rate: Normal  Vascular Findings:     Abdomen:  Abdomen Findings:  Normal     Musculoskeletal:  Musculoskeletal Findings:    Skin:  Skin Findings:     Mental Status:  Mental Status Findings:  Cooperative, Alert and Oriented         Anesthesia Plan  Type of Anesthesia, risks & benefits discussed:  Anesthesia Type:  general  Patient's Preference:   Intra-op Monitoring Plan: standard ASA monitors  Intra-op Monitoring Plan Comments:   Post Op Pain Control Plan: IV/PO Opioids PRN and multimodal analgesia  Post Op  Alar Island Pedicle Flap Text: The defect edges were debeveled with a #15 scalpel blade.  Given the location of the defect, shape of the defect and the proximity to the alar rim an island pedicle advancement flap was deemed most appropriate.  Using a sterile surgical marker, an appropriate advancement flap was drawn incorporating the defect, outlining the appropriate donor tissue and placing the expected incisions within the nasal ala running parallel to the alar rim. The area thus outlined was incised with a #15 scalpel blade.  The skin margins were undermined minimally to an appropriate distance in all directions around the primary defect and laterally outward around the island pedicle utilizing iris scissors.  There was minimal undermining beneath the pedicle flap. Bcc Infiltrative Histology Text: There were numerous aggregates of basaloid cells demonstrating an infiltrative pattern. Pain Control Plan Comments:   Induction:   IV and Inhalation  Beta Blocker:  Patient is not currently on a Beta-Blocker (No further documentation required).       Informed Consent: Patient understands risks and agrees with Anesthesia plan.  Questions answered. Anesthesia consent signed with patient.  ASA Score: 2     Day of Surgery Review of History & Physical: I have interviewed and examined the patient. I have reviewed the patient's H&P dated:        Anesthesia Plan Notes: Patient understands and accepts the risk of dental injury.  Discussed risk of obstructive sleep apnea with patient.  Advised to follow up with primary care physician.            Ready For Surgery From Anesthesia Perspective.        Mohs Method Verbiage: An incision at a 45 degree angle following the standard Mohs approach was done and the specimen was harvested as a microscopic controlled layer. Consent (Lip)/Introductory Paragraph: The rationale for Mohs was explained to the patient and consent was obtained. The risks, benefits and alternatives to therapy were discussed in detail. Specifically, the risks of lip deformity, changes in the oral aperture, infection, scarring, bleeding, prolonged wound healing, incomplete removal, allergy to anesthesia, nerve injury and recurrence were addressed. Prior to the procedure, the treatment site was clearly identified and confirmed by the patient. All components of Universal Protocol/PAUSE Rule completed. Cartilage Fenestration Performed For Additional Graft?: No Hemigard Retention Suture: 2-0 Nylon Consent 3/Introductory Paragraph: I gave the patient a chance to ask questions they had about the procedure.  Following this I explained the Mohs procedure and consent was obtained. The risks, benefits and alternatives to therapy were discussed in detail. Specifically, the risks of infection, scarring, bleeding, prolonged wound healing, incomplete removal, allergy to anesthesia, nerve injury and recurrence were addressed. Prior to the procedure, the treatment site was clearly identified and confirmed by the patient. All components of Universal Protocol/PAUSE Rule completed. Number Of Stages: 1 Retention Suture Bite Size: 3 mm Additional Anesthesia Volume In Cc: 6 Mauc Instructions: By selecting yes to the question below the MAUC number will be added into the note.  This will be calculated automatically based on the diagnosis chosen, the size entered, the body zone selected (H,M,L) and the specific indications you chose. You will also have the option to override the Mohs AUC if you disagree with the automatically calculated number and this option is found in the Case Summary tab. Dressing (No Sutures): dry sterile dressing Special Stains Stage 15 - Results: Base On Clearance Noted Above Graft Donor Site Bandage (Optional-Leave Blank If You Don't Want In Note): Steri-strips and a pressure bandage were applied to the donor site. Repair Type: Flap Burow's Advancement Flap Text: The defect edges were debeveled with a #15 scalpel blade.  Given the location of the defect and the proximity to free margins a Burow's advancement flap was deemed most appropriate.  Using a sterile surgical marker, the appropriate advancement flap was drawn incorporating the defect and placing the expected incisions within the relaxed skin tension lines where possible.    The area thus outlined was incised deep to adipose tissue with a #15 scalpel blade.  The skin margins were undermined to an appropriate distance in all directions utilizing iris scissors. Surgical Defect Length In Cm (Optional): 1.1 Quadrants Reporting?: 0 Date Of Previous Biopsy (Optional): 11/01/2021 Purse String (Intermediate) Text: Given the location of the defect and the characteristics of the surrounding skin a pursestring intermediate closure was deemed most appropriate.  Undermining was performed circumfirentially around the surgical defect.  A purstring suture was then placed and tightened. Otolaryngologist Procedure Text (D): After obtaining clear surgical margins the patient was sent to otolaryngology for surgical repair.  The patient understands they will receive post-surgical care and follow-up from the referring physician's office. Closure 3 Information: This tab is for additional flaps and grafts above and beyond our usual structured repairs.  Please note if you enter information here it will not currently bill and you will need to add the billing information manually. Trilobed Flap Text: The defect edges were debeveled with a #15 scalpel blade.  Given the location of the defect and the proximity to free margins a trilobed flap was deemed most appropriate.  Using a sterile surgical marker, an appropriate trilobed flap drawn around the defect.    The area thus outlined was incised deep to adipose tissue with a #15 scalpel blade.  The skin margins were undermined to an appropriate distance in all directions utilizing iris scissors. Referred To Plastics For Closure Text (Leave Blank If You Do Not Want): After obtaining clear surgical margins the patient was sent to plastics for surgical repair.  The patient understands they will receive post-surgical care and follow-up from the referring physician's office. Composite Graft Text: The defect edges were debeveled with a #15 scalpel blade.  Given the location of the defect, shape of the defect, the proximity to free margins and the fact the defect was full thickness a composite graft was deemed most appropriate.  The defect was outline and then transferred to the donor site.  A full thickness graft was then excised from the donor site. The graft was then placed in the primary defect, oriented appropriately and then sutured into place.  The secondary defect was then repaired using a primary closure. Full Thickness Lip Wedge Repair (Flap) Text: Given the location of the defect and the proximity to free margins a full thickness wedge repair was deemed most appropriate.  Using a sterile surgical marker, the appropriate repair was drawn incorporating the defect and placing the expected incisions perpendicular to the vermilion border.  The vermilion border was also meticulously outlined to ensure appropriate reapproximation during the repair.  The area thus outlined was incised through and through with a #15 scalpel blade.  The muscularis and dermis were reaproximated with deep sutures following hemostasis. Care was taken to realign the vermilion border before proceeding with the superficial closure.  Once the vermilion was realigned the superfical and mucosal closure was finished. Bi-Rhombic Flap Text: The defect edges were debeveled with a #15 scalpel blade.  Given the location of the defect and the proximity to free margins a bi-rhombic flap was deemed most appropriate.  Using a sterile surgical marker, an appropriate rhombic flap was drawn incorporating the defect. The area thus outlined was incised deep to adipose tissue with a #15 scalpel blade.  The skin margins were undermined to an appropriate distance in all directions utilizing iris scissors. Double M-Plasty Intermediate Repair Preamble Text (Leave Blank If You Do Not Want): Undermining was performed with blunt dissection. Asc Procedure Text (D): After obtaining clear surgical margins the patient was sent to an ASC for surgical repair.  The patient understands they will receive post-surgical care and follow-up from the ASC physician. O-T Plasty Text: The defect edges were debeveled with a #15 scalpel blade.  Given the location of the defect, shape of the defect and the proximity to free margins an O-T plasty was deemed most appropriate.  Using a sterile surgical marker, an appropriate O-T plasty was drawn incorporating the defect and placing the expected incisions within the relaxed skin tension lines where possible.    The area thus outlined was incised deep to adipose tissue with a #15 scalpel blade.  The skin margins were undermined to an appropriate distance in all directions utilizing iris scissors. Mustarde Flap Text: The defect edges were debeveled with a #15 scalpel blade.  Given the size, depth and location of the defect and the proximity to free margins a Mustarde flap was deemed most appropriate.  Using a sterile surgical marker, an appropriate flap was drawn incorporating the defect. The area thus outlined was incised with a #15 scalpel blade.  The skin margins were undermined to an appropriate distance in all directions utilizing iris scissors. Oculoplastic Surgeon Procedure Text (F): After obtaining clear surgical margins the patient was sent to oculoplastics for surgical repair.  The patient understands they will receive post-surgical care and follow-up from the referring physician's office. Secondary Defect Width In Cm (Required For Flaps): 1.8 Mid-Level Procedure Text (E): After obtaining clear surgical margins the patient was sent to a mid-level provider for surgical repair.  The patient understands they will receive post-surgical care and follow-up from the mid-level provider. Crescentic Complex Repair Preamble Text (Leave Blank If You Do Not Want): Extensive wide undermining was performed. Mart-1 - Positive Histology Text: MART-1 staining demonstrates areas of higher density and clustering of melanocytes with Pagetoid spread upwards within the epidermis. The surgical margins are positive for tumor cells. Rhomboid Transposition Flap Text: The defect edges were debeveled with a #15 scalpel blade.  Given the location of the defect and the proximity to free margins a rhomboid transposition flap was deemed most appropriate.  Using a sterile surgical marker, an appropriate rhomboid flap was drawn incorporating the defect.    The area thus outlined was incised deep to adipose tissue with a #15 scalpel blade.  The skin margins were undermined to an appropriate distance in all directions utilizing iris scissors. Was The Patient On Physician Recommended Anticoagulation Therapy?: Please Select the Appropriate Response Muscle Hinge Flap Text: The defect edges were debeveled with a #15 scalpel blade.  Given the size, depth and location of the defect and the proximity to free margins a muscle hinge flap was deemed most appropriate.  Using a sterile surgical marker, an appropriate hinge flap was drawn incorporating the defect. The area thus outlined was incised with a #15 scalpel blade.  The skin margins were undermined to an appropriate distance in all directions utilizing iris scissors. Secondary Defect Length In Cm (Required For Flaps): 1.4 Undermining Type: Entire Wound Eye Protection Verbiage: Before proceeding with the stage, a plastic scleral shield was inserted. The globe was anesthetized with a few drops of 1% lidocaine with 1:100,000 epinephrine. Then, an appropriate sized scleral shield was chosen and coated with lacrilube ointment. The shield was gently inserted and left in place for the duration of each stage. After the stage was completed, the shield was gently removed. Consent (Nose)/Introductory Paragraph: The rationale for Mohs was explained to the patient and consent was obtained. The risks, benefits and alternatives to therapy were discussed in detail. Specifically, the risks of nasal deformity, changes in the flow of air through the nose, infection, scarring, bleeding, prolonged wound healing, incomplete removal, allergy to anesthesia, nerve injury and recurrence were addressed. Prior to the procedure, the treatment site was clearly identified and confirmed by the patient. All components of Universal Protocol/PAUSE Rule completed. Consent 2/Introductory Paragraph: Mohs surgery was explained to the patient and consent was obtained. The risks, benefits and alternatives to therapy were discussed in detail. Specifically, the risks of infection, scarring, bleeding, prolonged wound healing, incomplete removal, allergy to anesthesia, nerve injury and recurrence were addressed. Prior to the procedure, the treatment site was clearly identified and confirmed by the patient. All components of Universal Protocol/PAUSE Rule completed. Show Repair Surgeon Variable: Yes Stage 9: Additional Anesthesia Type: 1% lidocaine with epinephrine Peng Advancement Flap Text: The defect edges were debeveled with a #15 scalpel blade.  Given the location of the defect, shape of the defect and the proximity to free margins a Peng advancement flap was deemed most appropriate.  Using a sterile surgical marker, an appropriate advancement flap was drawn incorporating the defect and placing the expected incisions within the relaxed skin tension lines where possible. The area thus outlined was incised deep to adipose tissue with a #15 scalpel blade.  The skin margins were undermined to an appropriate distance in all directions utilizing iris scissors. Complex Repair And Graft Additional Text (Will Appearing After The Standard Complex Repair Text): The complex repair was not sufficient to completely close the primary defect. The remaining additional defect was repaired with the graft mentioned below. O-Z Flap Text: The defect edges were debeveled with a #15 scalpel blade.  Given the location of the defect, shape of the defect and the proximity to free margins an O-Z flap was deemed most appropriate.  Using a sterile surgical marker, an appropriate transposition flap was drawn incorporating the defect and placing the expected incisions within the relaxed skin tension lines where possible. The area thus outlined was incised deep to adipose tissue with a #15 scalpel blade.  The skin margins were undermined to an appropriate distance in all directions utilizing iris scissors. Provider Procedure Text (F): After obtaining clear surgical margins the defect was repaired by another provider. Mohs Case Number: [id number] Purse String (Simple) Text: Given the location of the defect and the characteristics of the surrounding skin a pursestring closure was deemed most appropriate.  Undermining was performed circumfirentially around the surgical defect.  A purstring suture was then placed and tightened. Advancement Flap (Double) Text: The defect edges were debeveled with a #15 scalpel blade.  Given the location of the defect and the proximity to free margins a double advancement flap was deemed most appropriate.  Using a sterile surgical marker, the appropriate advancement flaps were drawn incorporating the defect and placing the expected incisions within the relaxed skin tension lines where possible.    The area thus outlined was incised deep to adipose tissue with a #15 scalpel blade.  The skin margins were undermined to an appropriate distance in all directions utilizing iris scissors. Bilobed Transposition Flap Text: The defect edges were debeveled with a #15 scalpel blade.  Given the location of the defect and the proximity to free margins a bilobed transposition flap was deemed most appropriate.  Using a sterile surgical marker, an appropriate bilobe flap drawn around the defect.    The area thus outlined was incised deep to adipose tissue with a #15 scalpel blade.  The skin margins were undermined to an appropriate distance in all directions utilizing iris scissors. Cartilage Graft Text: The defect edges were debeveled with a #15 scalpel blade.  Given the location of the defect, shape of the defect, the fact the defect involved a full thickness cartilage defect a cartilage graft was deemed most appropriate.  An appropriate donor site was identified, cleansed, and anesthetized. The cartilage graft was then harvested and transferred to the recipient site, oriented appropriately and then sutured into place.  The secondary defect was then repaired using a primary closure. W Plasty Text: The lesion was extirpated to the level of the fat with a #15 scalpel blade.  Given the location of the defect, shape of the defect and the proximity to free margins a W-plasty was deemed most appropriate for repair.  Using a sterile surgical marker, the appropriate transposition arms of the W-plasty were drawn incorporating the defect and placing the expected incisions within the relaxed skin tension lines where possible.    The area thus outlined was incised deep to adipose tissue with a #15 scalpel blade.  The skin margins were undermined to an appropriate distance in all directions utilizing iris scissors.  The opposing transposition arms were then transposed into place in opposite direction and anchored with interrupted buried subcutaneous sutures. Donor Site Anesthesia Type: same as repair anesthesia Bilobed Flap Text: The defect edges were debeveled with a #15 scalpel blade.  Given the location of the defect and the proximity to free margins a bilobe flap was deemed most appropriate.  Using a sterile surgical marker, an appropriate bilobe flap drawn around the defect.    The area thus outlined was incised deep to adipose tissue with a #15 scalpel blade.  The skin margins were undermined to an appropriate distance in all directions utilizing iris scissors. Burow's Graft Text: The defect edges were debeveled with a #15 scalpel blade.  Given the location of the defect, shape of the defect, the proximity to free margins and the presence of a standing cone deformity a Burow's skin graft was deemed most appropriate. The standing cone was removed and this tissue was then trimmed to the shape of the primary defect. The adipose tissue was also removed until only dermis and epidermis were left.  The skin margins of the secondary defect were undermined to an appropriate distance in all directions utilizing iris scissors.  The secondary defect was closed with interrupted buried subcutaneous sutures.  The skin edges were then re-apposed with running  sutures.  The skin graft was then placed in the primary defect and oriented appropriately. Hemigard Postcare Instructions: The HEMIGARD strips are to remain completely dry for at least 5-7 days. Ear Wedge Repair Text: A wedge excision was completed by carrying down an excision through the full thickness of the ear and cartilage with an inward facing Burow's triangle. The wound was then closed in a layered fashion. Rhombic Flap Text: The defect edges were debeveled with a #15 scalpel blade.  Given the location of the defect and the proximity to free margins a rhombic flap was deemed most appropriate.  Using a sterile surgical marker, an appropriate rhombic flap was drawn incorporating the defect.    The area thus outlined was incised deep to adipose tissue with a #15 scalpel blade.  The skin margins were undermined to an appropriate distance in all directions utilizing iris scissors. Mastoid Interpolation Flap Text: A decision was made to reconstruct the defect utilizing an interpolation axial flap and a staged reconstruction.  A telfa template was made of the defect.  This telfa template was then used to outline the mastoid interpolation flap.  The donor area for the pedicle flap was then injected with anesthesia.  The flap was excised through the skin and subcutaneous tissue down to the layer of the underlying musculature.  The pedicle flap was carefully excised within this deep plane to maintain its blood supply.  The edges of the donor site were undermined.   The donor site was closed in a primary fashion.  The pedicle was then rotated into position and sutured.  Once the tube was sutured into place, adequate blood supply was confirmed with blanching and refill.  The pedicle was then wrapped with xeroform gauze and dressed appropriately with a telfa and gauze bandage to ensure continued blood supply and protect the attached pedicle. Transposition Flap Text: The defect edges were debeveled with a #15 scalpel blade.  Given the location of the defect and the proximity to free margins a transposition flap was deemed most appropriate.  Using a sterile surgical marker, an appropriate transposition flap was drawn incorporating the defect.    The area thus outlined was incised deep to adipose tissue with a #15 scalpel blade.  The skin margins were undermined to an appropriate distance in all directions utilizing iris scissors. Keystone Flap Text: The defect edges were debeveled with a #15 scalpel blade.  Given the location of the defect, shape of the defect a keystone flap was deemed most appropriate.  Using a sterile surgical marker, an appropriate keystone flap was drawn incorporating the defect, outlining the appropriate donor tissue and placing the expected incisions within the relaxed skin tension lines where possible. The area thus outlined was incised deep to adipose tissue with a #15 scalpel blade.  The skin margins were undermined to an appropriate distance in all directions around the primary defect and laterally outward around the flap utilizing iris scissors. Bcc Histology Text: There were numerous aggregates of basaloid cells. Anesthesia Type: 1% lidocaine with epinephrine and a 1:10 solution of 8.4% sodium bicarbonate Consent (Ear)/Introductory Paragraph: The rationale for Mohs was explained to the patient and consent was obtained. The risks, benefits and alternatives to therapy were discussed in detail. Specifically, the risks of ear deformity, infection, scarring, bleeding, prolonged wound healing, incomplete removal, allergy to anesthesia, nerve injury and recurrence were addressed. Prior to the procedure, the treatment site was clearly identified and confirmed by the patient. All components of Universal Protocol/PAUSE Rule completed. Ftsg Text: The defect edges were debeveled with a #15 scalpel blade.  Given the location of the defect, shape of the defect and the proximity to free margins a full thickness skin graft was deemed most appropriate.  Using a sterile surgical marker, the primary defect shape was transferred to the donor site. The area thus outlined was incised deep to adipose tissue with a #15 scalpel blade.  The harvested graft was then trimmed of adipose tissue until only dermis and epidermis was left.  The skin margins of the secondary defect were undermined to an appropriate distance in all directions utilizing iris scissors.  The secondary defect was closed with interrupted buried subcutaneous sutures.  The skin edges were then re-apposed with running  sutures.  The skin graft was then placed in the primary defect and oriented appropriately. Pain Refusal Text: I offered to prescribe pain medication but the patient refused to take this medication. X Size Of Lesion In Cm (Optional): 0.4 Mucosal Advancement Flap Text: Given the location of the defect, shape of the defect and the proximity to free margins a mucosal advancement flap was deemed most appropriate. Incisions were made with a 15 blade scalpel in the appropriate fashion along the cutaneous vermilion border and the mucosal lip. The remaining actinically damaged mucosal tissue was excised.  The mucosal advancement flap was then elevated to the gingival sulcus with care taken to preserve the neurovascular structures and advanced into the primary defect. Care was taken to ensure that precise realignment of the vermilion border was achieved. Home Suture Removal Text: Patient was provided instructions on removing sutures and will remove their sutures at home.  If they have any questions or difficulties they will call the office. Complex Repair And Flap Additional Text (Will Appearing After The Standard Complex Repair Text): The complex repair was not sufficient to completely close the primary defect. The remaining additional defect was repaired with the flap mentioned below. O-L Flap Text: The defect edges were debeveled with a #15 scalpel blade.  Given the location of the defect, shape of the defect and the proximity to free margins an O-L flap was deemed most appropriate.  Using a sterile surgical marker, an appropriate advancement flap was drawn incorporating the defect and placing the expected incisions within the relaxed skin tension lines where possible.    The area thus outlined was incised deep to adipose tissue with a #15 scalpel blade.  The skin margins were undermined to an appropriate distance in all directions utilizing iris scissors. Advancement Flap (Single) Text: The defect edges were debeveled with a #15 scalpel blade.  Given the location of the defect and the proximity to free margins a single advancement flap was deemed most appropriate.  Using a sterile surgical marker, an appropriate advancement flap was drawn incorporating the defect and placing the expected incisions within the relaxed skin tension lines where possible.    The area thus outlined was incised deep to adipose tissue with a #15 scalpel blade.  The skin margins were undermined to an appropriate distance in all directions utilizing iris scissors. Xenograft Text: The defect edges were debeveled with a #15 scalpel blade.  Given the location of the defect, shape of the defect and the proximity to free margins a xenograft was deemed most appropriate.  The graft was then trimmed to fit the size of the defect.  The graft was then placed in the primary defect and oriented appropriately. Estimated Blood Loss (Cc): minimal Suture Removal: 12 days Length To Time In Minutes Device Was In Place: 10 Surgical Defect Width In Cm (Optional): 1.0 Cheek-To-Nose Interpolation Flap Text: A decision was made to reconstruct the defect utilizing an interpolation axial flap and a staged reconstruction.  A telfa template was made of the defect.  This telfa template was then used to outline the Cheek-To-Nose Interpolation flap.  The donor area for the pedicle flap was then injected with anesthesia.  The flap was excised through the skin and subcutaneous tissue down to the layer of the underlying musculature.  The interpolation flap was carefully excised within this deep plane to maintain its blood supply.  The edges of the donor site were undermined.   The donor site was closed in a primary fashion.  The pedicle was then rotated into position and sutured.  Once the tube was sutured into place, adequate blood supply was confirmed with blanching and refill.  The pedicle was then wrapped with xeroform gauze and dressed appropriately with a telfa and gauze bandage to ensure continued blood supply and protect the attached pedicle. Adjacent Tissue Transfer Text: The defect edges were debeveled with a #15 scalpel blade.  Given the location of the defect and the proximity to free margins an adjacent tissue transfer was deemed most appropriate.  Using a sterile surgical marker, an appropriate flap was drawn incorporating the defect and placing the expected incisions within the relaxed skin tension lines where possible.    The area thus outlined was incised deep to adipose tissue with a #15 scalpel blade.  The skin margins were undermined to an appropriate distance in all directions utilizing iris scissors. Tissue Cultured Epidermal Autograft Text: The defect edges were debeveled with a #15 scalpel blade.  Given the location of the defect, shape of the defect and the proximity to free margins a tissue cultured epidermal autograft was deemed most appropriate.  The graft was then trimmed to fit the size of the defect.  The graft was then placed in the primary defect and oriented appropriately. Split-Thickness Skin Graft Text: The defect edges were debeveled with a #15 scalpel blade.  Given the location of the defect, shape of the defect and the proximity to free margins a split thickness skin graft was deemed most appropriate.  Using a sterile surgical marker, the primary defect shape was transferred to the donor site. The split thickness graft was then harvested.  The skin graft was then placed in the primary defect and oriented appropriately. Information: Selecting Yes will display possible errors in your note based on the variables you have selected. This validation is only offered as a suggestion for you. PLEASE NOTE THAT THE VALIDATION TEXT WILL BE REMOVED WHEN YOU FINALIZE YOUR NOTE. IF YOU WANT TO FAX A PRELIMINARY NOTE YOU WILL NEED TO TOGGLE THIS TO 'NO' IF YOU DO NOT WANT IT IN YOUR FAXED NOTE. Banner Transposition Flap Text: The defect edges were debeveled with a #15 scalpel blade.  Given the location of the defect and the proximity to free margins a Banner transposition flap was deemed most appropriate.  Using a sterile surgical marker, an appropriate flap drawn around the defect. The area thus outlined was incised deep to adipose tissue with a #15 scalpel blade.  The skin margins were undermined to an appropriate distance in all directions utilizing iris scissors. No Repair - Repaired With Adjacent Surgical Defect Text (Leave Blank If You Do Not Want): After obtaining clear surgical margins the defect was repaired concurrently with another surgical defect which was in close approximation. Hemigard Intro: Due to skin fragility and wound tension, it was decided to use HEMIGARD adhesive retention suture devices to permit a linear closure. The skin was cleaned and dried for a 6cm distance away from the wound. Excessive hair, if present, was removed to allow for adhesion. Post-Care Instructions: I reviewed with the patient in detail post-care instructions. Patient is not to engage in any heavy lifting, exercise, or swimming for the next 14 days. Should the patient develop any fevers, chills, bleeding, severe pain patient will contact the office immediately. Melolabial Transposition Flap Text: The defect edges were debeveled with a #15 scalpel blade.  Given the location of the defect and the proximity to free margins a melolabial flap was deemed most appropriate.  Using a sterile surgical marker, an appropriate melolabial transposition flap was drawn incorporating the defect.    The area thus outlined was incised deep to adipose tissue with a #15 scalpel blade.  The skin margins were undermined to an appropriate distance in all directions utilizing iris scissors. Mohs Rapid Report Verbiage: The area of clinically evident tumor was marked with skin marking ink and appropriately hatched.  The initial incision was made following the Mohs approach through the skin.  The specimen was taken to the lab, divided into the necessary number of pieces, chromacoded and processed according to the Mohs protocol.  This was repeated in successive stages until a tumor free defect was achieved. Star Wedge Flap Text: The defect edges were debeveled with a #15 scalpel blade.  Given the location of the defect, shape of the defect and the proximity to free margins a star wedge flap was deemed most appropriate.  Using a sterile surgical marker, an appropriate rotation flap was drawn incorporating the defect and placing the expected incisions within the relaxed skin tension lines where possible. The area thus outlined was incised deep to adipose tissue with a #15 scalpel blade.  The skin margins were undermined to an appropriate distance in all directions utilizing iris scissors. Cheiloplasty (Less Than 50%) Text: A decision was made to reconstruct the defect with a  cheiloplasty.  The defect was undermined extensively.  Additional obicularis oris muscle was excised with a 15 blade scalpel.  The defect was converted into a full thickness wedge, of less than 50% of the vertical height of the lip, to facilite a better cosmetic result.  Small vessels were then tied off with 5-0 monocyrl. The obicularis oris, superficial fascia, adipose and dermis were then reapproximated.  After the deeper layers were approximated the epidermis was reapproximated with particular care given to realign the vermilion border. Consent 1/Introductory Paragraph: The rationale for Mohs was explained to the patient and consent was obtained. The risks, benefits and alternatives to therapy were discussed in detail. Specifically, the risks of infection, scarring, bleeding, prolonged wound healing, incomplete removal, allergy to anesthesia, nerve injury and recurrence were addressed. Prior to the procedure, the treatment site was clearly identified and confirmed by the patient. All components of Universal Protocol/PAUSE Rule completed. Double O-Z Plasty Text: The defect edges were debeveled with a #15 scalpel blade.  Given the location of the defect, shape of the defect and the proximity to free margins a Double O-Z plasty (double transposition flap) was deemed most appropriate.  Using a sterile surgical marker, the appropriate transposition flaps were drawn incorporating the defect and placing the expected incisions within the relaxed skin tension lines where possible. The area thus outlined was incised deep to adipose tissue with a #15 scalpel blade.  The skin margins were undermined to an appropriate distance in all directions utilizing iris scissors.  Hemostasis was achieved with electrocautery.  The flaps were then transposed into place, one clockwise and the other counterclockwise, and anchored with interrupted buried subcutaneous sutures. Initial Size Of Lesion: 0.7 Modified Advancement Flap Text: The defect edges were debeveled with a #15 scalpel blade.  Given the location of the defect, shape of the defect and the proximity to free margins a modified advancement flap was deemed most appropriate.  Using a sterile surgical marker, an appropriate advancement flap was drawn incorporating the defect and placing the expected incisions within the relaxed skin tension lines where possible.    The area thus outlined was incised deep to adipose tissue with a #15 scalpel blade.  The skin margins were undermined to an appropriate distance in all directions utilizing iris scissors. Posterior Auricular Interpolation Flap Text: A decision was made to reconstruct the defect utilizing an interpolation axial flap and a staged reconstruction.  A telfa template was made of the defect.  This telfa template was then used to outline the posterior auricular interpolation flap.  The donor area for the pedicle flap was then injected with anesthesia.  The flap was excised through the skin and subcutaneous tissue down to the layer of the underlying musculature.  The pedicle flap was carefully excised within this deep plane to maintain its blood supply.  The edges of the donor site were undermined.   The donor site was closed in a primary fashion.  The pedicle was then rotated into position and sutured.  Once the tube was sutured into place, adequate blood supply was confirmed with blanching and refill.  The pedicle was then wrapped with xeroform gauze and dressed appropriately with a telfa and gauze bandage to ensure continued blood supply and protect the attached pedicle. Unna Boot Text: An Unna boot was placed to help immobilize the limb and facilitate more rapid healing. Tarsorrhaphy Text: A tarsorrhaphy was performed using Frost sutures. O-T Advancement Flap Text: The defect edges were debeveled with a #15 scalpel blade.  Given the location of the defect, shape of the defect and the proximity to free margins an O-T advancement flap was deemed most appropriate.  Using a sterile surgical marker, an appropriate advancement flap was drawn incorporating the defect and placing the expected incisions within the relaxed skin tension lines where possible.    The area thus outlined was incised deep to adipose tissue with a #15 scalpel blade.  The skin margins were undermined to an appropriate distance in all directions utilizing iris scissors. A-T Advancement Flap Text: The defect edges were debeveled with a #15 scalpel blade.  Given the location of the defect, shape of the defect and the proximity to free margins an A-T advancement flap was deemed most appropriate.  Using a sterile surgical marker, an appropriate advancement flap was drawn incorporating the defect and placing the expected incisions within the relaxed skin tension lines where possible.    The area thus outlined was incised deep to adipose tissue with a #15 scalpel blade.  The skin margins were undermined to an appropriate distance in all directions utilizing iris scissors. Localized Dermabrasion With Wire Brush Text: The patient was draped in routine manner.  Localized dermabrasion using 3 x 17 mm wire brush was performed in routine manner to papillary dermis. This spot dermabrasion is being performed to complete skin cancer reconstruction. It also will eliminate the other sun damaged precancerous cells that are known to be part of the regional effect of a lifetime's worth of sun exposure. This localized dermabrasion is therapeutic and should not be considered cosmetic in any regard. Closure 2 Information: This tab is for additional flaps and grafts, including complex repair and grafts and complex repair and flaps. You can also specify a different location for the additional defect, if the location is the same you do not need to select a new one. We will insert the automated text for the repair you select below just as we do for solitary flaps and grafts. Please note that at this time if you select a location with a different insurance zone you will need to override the ICD10 and CPT if appropriate. Island Pedicle Flap With Canthal Suspension Text: The defect edges were debeveled with a #15 scalpel blade.  Given the location of the defect, shape of the defect and the proximity to free margins an island pedicle advancement flap was deemed most appropriate.  Using a sterile surgical marker, an appropriate advancement flap was drawn incorporating the defect, outlining the appropriate donor tissue and placing the expected incisions within the relaxed skin tension lines where possible. The area thus outlined was incised deep to adipose tissue with a #15 scalpel blade.  The skin margins were undermined to an appropriate distance in all directions around the primary defect and laterally outward around the island pedicle utilizing iris scissors.  There was minimal undermining beneath the pedicle flap. A suspension suture was placed in the canthal tendon to prevent tension and prevent ectropion. Skin Substitute Text: The defect edges were debeveled with a #15 scalpel blade.  Given the location of the defect, shape of the defect and the proximity to free margins a skin substitute graft was deemed most appropriate.  The graft material was trimmed to fit the size of the defect. The graft was then placed in the primary defect and oriented appropriately. Secondary Intention Text (Leave Blank If You Do Not Want): The defect will heal with secondary intention. Where Do You Want The Question To Include Opioid Counseling Located?: Case Summary Tab Suturegard Body: The suture ends were repeatedly re-tightened and re-clamped to achieve the desired tissue expansion. Ear Star Wedge Flap Text: The defect edges were debeveled with a #15 blade scalpel.  Given the location of the defect and the proximity to free margins (helical rim) an ear star wedge flap was deemed most appropriate.  Using a sterile surgical marker, the appropriate flap was drawn incorporating the defect and placing the expected incisions between the helical rim and antihelix where possible.  The area thus outlined was incised through and through with a #15 scalpel blade. Orbicularis Oris Muscle Flap Text: The defect edges were debeveled with a #15 scalpel blade.  Given that the defect affected the competency of the oral sphincter an obicularis oris muscle flap was deemed most appropriate to restore this competency and normal muscle function.  Using a sterile surgical marker, an appropriate flap was drawn incorporating the defect. The area thus outlined was incised with a #15 scalpel blade. Debridement Text: The wound edges were debrided prior to proceeding with the closure to facilitate wound healing. Nostril Rim Text: The closure involved the nostril rim. Previous Accession (Optional): [id number] Consent (Near Eyelid Margin)/Introductory Paragraph: The rationale for Mohs was explained to the patient and consent was obtained. The risks, benefits and alternatives to therapy were discussed in detail. Specifically, the risks of ectropion or eyelid deformity, infection, scarring, bleeding, prolonged wound healing, incomplete removal, allergy to anesthesia, nerve injury and recurrence were addressed. Prior to the procedure, the treatment site was clearly identified and confirmed by the patient. All components of Universal Protocol/PAUSE Rule completed. Staging Info: By selecting yes to the question above you will include information on AJCC 8 tumor staging in your Mohs note. Information on tumor staging will be automatically added for SCCs on the head and neck. AJCC 8 includes tumor size, tumor depth, perineural involvement and bone invasion. Inflammation Suggestive Of Cancer Camouflage Histology Text: There was a dense lymphocytic infiltrate which prevented adequate histologic evaluation of adjacent structures. Alternatives Discussed Intro (Do Not Add Period): I discussed alternative treatments to Mohs surgery and specifically discussed the risks and benefits of Surgeon Performing Repair (Optional): Trino Tumor Depth: Less than 6mm from granular layer and no invasion beyond the subcutaneous fat Mercedes Flap Text: The defect edges were debeveled with a #15 scalpel blade.  Given the location of the defect, shape of the defect and the proximity to free margins a Mercedes flap was deemed most appropriate.  Using a sterile surgical marker, an appropriate advancement flap was drawn incorporating the defect and placing the expected incisions within the relaxed skin tension lines where possible. The area thus outlined was incised deep to adipose tissue with a #15 scalpel blade.  The skin margins were undermined to an appropriate distance in all directions utilizing iris scissors. Consent Type: Consent 1 (Standard) Retention Suture Text: Retention sutures were placed to support the closure and prevent dehiscence. Epidermal Sutures: 5-0 Nylon Advancement-Rotation Flap Text: The defect edges were debeveled with a #15 scalpel blade.  Given the location of the defect, shape of the defect and the proximity to free margins an advancement-rotation flap was deemed most appropriate.  Using a sterile surgical marker, an appropriate flap was drawn incorporating the defect and placing the expected incisions within the relaxed skin tension lines where possible. The area thus outlined was incised deep to adipose tissue with a #15 scalpel blade.  The skin margins were undermined to an appropriate distance in all directions utilizing iris scissors. H Plasty Text: Given the location of the defect, shape of the defect and the proximity to free margins a H-plasty was deemed most appropriate for repair.  Using a sterile surgical marker, the appropriate advancement arms of the H-plasty were drawn incorporating the defect and placing the expected incisions within the relaxed skin tension lines where possible. The area thus outlined was incised deep to adipose tissue with a #15 scalpel blade. The skin margins were undermined to an appropriate distance in all directions utilizing iris scissors.  The opposing advancement arms were then advanced into place in opposite direction and anchored with interrupted buried subcutaneous sutures. Deep Sutures: 5-0 Polysorb Crescentic Advancement Flap Text: The defect edges were debeveled with a #15 scalpel blade.  Given the location of the defect and the proximity to free margins a crescentic advancement flap was deemed most appropriate.  Using a sterile surgical marker, the appropriate advancement flap was drawn incorporating the defect and placing the expected incisions within the relaxed skin tension lines where possible.    The area thus outlined was incised deep to adipose tissue with a #15 scalpel blade.  The skin margins were undermined to an appropriate distance in all directions utilizing iris scissors. Partial Purse String (Intermediate) Text: Given the location of the defect and the characteristics of the surrounding skin an intermediate purse string closure was deemed most appropriate.  Undermining was performed circumfirentially around the surgical defect.  A purse string suture was then placed and tightened. Wound tension only allowed a partial closure of the circular defect. Cheek Interpolation Flap Text: A decision was made to reconstruct the defect utilizing an interpolation axial flap and a staged reconstruction.  A telfa template was made of the defect.  This telfa template was then used to outline the Cheek Interpolation flap.  The donor area for the pedicle flap was then injected with anesthesia.  The flap was excised through the skin and subcutaneous tissue down to the layer of the underlying musculature.  The interpolation flap was carefully excised within this deep plane to maintain its blood supply.  The edges of the donor site were undermined.   The donor site was closed in a primary fashion.  The pedicle was then rotated into position and sutured.  Once the tube was sutured into place, adequate blood supply was confirmed with blanching and refill.  The pedicle was then wrapped with xeroform gauze and dressed appropriately with a telfa and gauze bandage to ensure continued blood supply and protect the attached pedicle. Island Pedicle Flap Text: The defect edges were debeveled with a #15 scalpel blade.  Given the location of the defect, shape of the defect and the proximity to free margins an island pedicle advancement flap was deemed most appropriate.  Using a sterile surgical marker, an appropriate advancement flap was drawn incorporating the defect, outlining the appropriate donor tissue and placing the expected incisions within the relaxed skin tension lines where possible.    The area thus outlined was incised deep to adipose tissue with a #15 scalpel blade.  The skin margins were undermined to an appropriate distance in all directions around the primary defect and laterally outward around the island pedicle utilizing iris scissors.  There was minimal undermining beneath the pedicle flap. Dermal Autograft Text: The defect edges were debeveled with a #15 scalpel blade.  Given the location of the defect, shape of the defect and the proximity to free margins a dermal autograft was deemed most appropriate.  Using a sterile surgical marker, the primary defect shape was transferred to the donor site. The area thus outlined was incised deep to adipose tissue with a #15 scalpel blade.  The harvested graft was then trimmed of adipose and epidermal tissue until only dermis was left.  The skin graft was then placed in the primary defect and oriented appropriately. Postop Diagnosis: same Area L Indication Text: Tumors in this location are included in Area L (trunk and extremities).  Mohs surgery is indicated for larger tumors, or tumors with aggressive histologic features, in these anatomic locations. Staged Advancement Flap Text: The defect edges were debeveled with a #15 scalpel blade.  Given the location of the defect, shape of the defect and the proximity to free margins a staged advancement flap was deemed most appropriate.  Using a sterile surgical marker, an appropriate advancement flap was drawn incorporating the defect and placing the expected incisions within the relaxed skin tension lines where possible. The area thus outlined was incised deep to adipose tissue with a #15 scalpel blade.  The skin margins were undermined to an appropriate distance in all directions utilizing iris scissors. Subsequent Stages Histo Method Verbiage: Using a similar technique to that described above, a thin layer of tissue was removed from all areas where tumor was visible on the previous stage.  The tissue was again oriented, mapped, dyed, and processed as above. Island Pedicle Flap-Requiring Vessel Identification Text: The defect edges were debeveled with a #15 scalpel blade.  Given the location of the defect, shape of the defect and the proximity to free margins an island pedicle advancement flap was deemed most appropriate.  Using a sterile surgical marker, an appropriate advancement flap was drawn, based on the axial vessel mentioned above, incorporating the defect, outlining the appropriate donor tissue and placing the expected incisions within the relaxed skin tension lines where possible.    The area thus outlined was incised deep to adipose tissue with a #15 scalpel blade.  The skin margins were undermined to an appropriate distance in all directions around the primary defect and laterally outward around the island pedicle utilizing iris scissors.  There was minimal undermining beneath the pedicle flap. Melolabial Interpolation Flap Text: A decision was made to reconstruct the defect utilizing an interpolation axial flap and a staged reconstruction.  A telfa template was made of the defect.  This telfa template was then used to outline the melolabial interpolation flap.  The donor area for the pedicle flap was then injected with anesthesia.  The flap was excised through the skin and subcutaneous tissue down to the layer of the underlying musculature.  The pedicle flap was carefully excised within this deep plane to maintain its blood supply.  The edges of the donor site were undermined.   The donor site was closed in a primary fashion.  The pedicle was then rotated into position and sutured.  Once the tube was sutured into place, adequate blood supply was confirmed with blanching and refill.  The pedicle was then wrapped with xeroform gauze and dressed appropriately with a telfa and gauze bandage to ensure continued blood supply and protect the attached pedicle. Consent (Spinal Accessory)/Introductory Paragraph: The rationale for Mohs was explained to the patient and consent was obtained. The risks, benefits and alternatives to therapy were discussed in detail. Specifically, the risks of damage to the spinal accessory nerve, infection, scarring, bleeding, prolonged wound healing, incomplete removal, allergy to anesthesia, and recurrence were addressed. Prior to the procedure, the treatment site was clearly identified and confirmed by the patient. All components of Universal Protocol/PAUSE Rule completed. No Residual Tumor Seen Histology Text: There were no malignant cells seen in the sections examined. Z Plasty Text: The lesion was extirpated to the level of the fat with a #15 scalpel blade.  Given the location of the defect, shape of the defect and the proximity to free margins a Z-plasty was deemed most appropriate for repair.  Using a sterile surgical marker, the appropriate transposition arms of the Z-plasty were drawn incorporating the defect and placing the expected incisions within the relaxed skin tension lines where possible.    The area thus outlined was incised deep to adipose tissue with a #15 scalpel blade.  The skin margins were undermined to an appropriate distance in all directions utilizing iris scissors.  The opposing transposition arms were then transposed into place in opposite direction and anchored with interrupted buried subcutaneous sutures. Vermilion Border Text: The closure involved the vermilion border. Medical Necessity Statement: Based on my medical judgement, Mohs surgery is the most appropriate treatment for this cancer compared to other treatments. Epidermal Closure: running and interrupted V-Y Flap Text: The defect edges were debeveled with a #15 scalpel blade.  Given the location of the defect, shape of the defect and the proximity to free margins a V-Y flap was deemed most appropriate.  Using a sterile surgical marker, an appropriate advancement flap was drawn incorporating the defect and placing the expected incisions within the relaxed skin tension lines where possible.    The area thus outlined was incised deep to adipose tissue with a #15 scalpel blade.  The skin margins were undermined to an appropriate distance in all directions utilizing iris scissors. V-Y Plasty Text: The defect edges were debeveled with a #15 scalpel blade.  Given the location of the defect, shape of the defect and the proximity to free margins an V-Y advancement flap was deemed most appropriate.  Using a sterile surgical marker, an appropriate advancement flap was drawn incorporating the defect and placing the expected incisions within the relaxed skin tension lines where possible.    The area thus outlined was incised deep to adipose tissue with a #15 scalpel blade.  The skin margins were undermined to an appropriate distance in all directions utilizing iris scissors. Brow Lift Text: A midfrontal incision was made medially to the defect to allow access to the tissues just superior to the left eyebrow. Following careful dissection inferiorly in a supraperiosteal plane to the level of the left eyebrow, several 3-0 monocryl sutures were used to resuspend the eyebrow orbicularis oculi muscular unit to the superior frontal bone periosteum. This resulted in an appropriate reapproximation of static eyebrow symmetry and correction of the left brow ptosis. Cheiloplasty (Complex) Text: A decision was made to reconstruct the defect with a  cheiloplasty.  The defect was undermined extensively.  Additional obicularis oris muscle was excised with a 15 blade scalpel.  The defect was converted into a full thickness wedge to facilite a better cosmetic result.  Small vessels were then tied off with 5-0 monocyrl. The obicularis oris, superficial fascia, adipose and dermis were then reapproximated.  After the deeper layers were approximated the epidermis was reapproximated with particular care given to realign the vermilion border. Partial Purse String (Simple) Text: Given the location of the defect and the characteristics of the surrounding skin a simple purse string closure was deemed most appropriate.  Undermining was performed circumfirentially around the surgical defect.  A purse string suture was then placed and tightened. Wound tension only allowed a partial closure of the circular defect. Chonodrocutaneous Helical Advancement Flap Text: The defect edges were debeveled with a #15 scalpel blade.  Given the location of the defect and the proximity to free margins a chondrocutaneous helical advancement flap was deemed most appropriate.  Using a sterile surgical marker, the appropriate advancement flap was drawn incorporating the defect and placing the expected incisions within the relaxed skin tension lines where possible.    The area thus outlined was incised deep to adipose tissue with a #15 scalpel blade.  The skin margins were undermined to an appropriate distance in all directions utilizing iris scissors. Bilateral Helical Rim Advancement Flap Text: The defect edges were debeveled with a #15 blade scalpel.  Given the location of the defect and the proximity to free margins (helical rim) a bilateral helical rim advancement flap was deemed most appropriate.  Using a sterile surgical marker, the appropriate advancement flaps were drawn incorporating the defect and placing the expected incisions between the helical rim and antihelix where possible.  The area thus outlined was incised through and through with a #15 scalpel blade.  With a skin hook and iris scissors, the flaps were gently and sharply undermined and freed up. Zygomaticofacial Flap Text: Given the location of the defect, shape of the defect and the proximity to free margins a zygomaticofacial flap was deemed most appropriate for repair.  Using a sterile surgical marker, the appropriate flap was drawn incorporating the defect and placing the expected incisions within the relaxed skin tension lines where possible. The area thus outlined was incised deep to adipose tissue with a #15 scalpel blade with preservation of a vascular pedicle.  The skin margins were undermined to an appropriate distance in all directions utilizing iris scissors.  The flap was then placed into the defect and anchored with interrupted buried subcutaneous sutures. Graft Cartilage Fenestration Text: The cartilage was fenestrated with a 2mm punch biopsy to help facilitate graft survival and healing. Suturegard Intro: Intraoperative tissue expansion was performed, utilizing the SUTUREGARD device, in order to reduce wound tension. Manual Repair Warning Statement: We plan on removing the manually selected variable below in favor of our much easier automatic structured text blocks found in the previous tab. We decided to do this to help make the flow better and give you the full power of structured data. Manual selection is never going to be ideal in our platform and I would encourage you to avoid using manual selection from this point on, especially since I will be sunsetting this feature. It is important that you do one of two things with the customized text below. First, you can save all of the text in a word file so you can have it for future reference. Second, transfer the text to the appropriate area in the Library tab. Lastly, if there is a flap or graft type which we do not have you need to let us know right away so I can add it in before the variable is hidden. No need to panic, we plan to give you roughly 6 months to make the change. Nasalis-Muscle-Based Myocutaneous Island Pedicle Flap Text: Using a #15 blade, an incision was made around the donor flap to the level of the nasalis muscle. Wide lateral undermining was then performed in both the subcutaneous plane above the nasalis muscle, and in a submuscular plane just above periosteum. This allowed the formation of a free nasalis muscle axial pedicle (based on the angular artery) which was still attached to the actual cutaneous flap, increasing its mobility and vascular viability. Hemostasis was obtained with pinpoint electrocoagulation. The flap was mobilized into position and the pivotal anchor points positioned and stabilized with buried interrupted sutures. Subcutaneous and dermal tissues were closed in a multilayered fashion with sutures. Tissue redundancies were excised, and the epidermal edges were apposed without significant tension and sutured with sutures. Area M Indication Text: Tumors in this location are included in Area M (cheek, forehead, scalp, neck, jawline and pretibial skin).  Mohs surgery is indicated for tumors in these anatomic locations. Spiral Flap Text: The defect edges were debeveled with a #15 scalpel blade.  Given the location of the defect, shape of the defect and the proximity to free margins a spiral flap was deemed most appropriate.  Using a sterile surgical marker, an appropriate rotation flap was drawn incorporating the defect and placing the expected incisions within the relaxed skin tension lines where possible. The area thus outlined was incised deep to adipose tissue with a #15 scalpel blade.  The skin margins were undermined to an appropriate distance in all directions utilizing iris scissors. Wound Care (No Sutures): Petrolatum Mohs Histo Method Verbiage: Each section was then chromacoded and processed in the Mohs lab using the Mohs protocol and submitted for frozen section. Detail Level: Detailed Helical Rim Text: The closure involved the helical rim. Wound Care: Vaniply Consent (Marginal Mandibular)/Introductory Paragraph: The rationale for Mohs was explained to the patient and consent was obtained. The risks, benefits and alternatives to therapy were discussed in detail. Specifically, the risks of damage to the marginal mandibular branch of the facial nerve, infection, scarring, bleeding, prolonged wound healing, incomplete removal, allergy to anesthesia, and recurrence were addressed. Prior to the procedure, the treatment site was clearly identified and confirmed by the patient. All components of Universal Protocol/PAUSE Rule completed. Same Histology In Subsequent Stages Text: The pattern and morphology of the tumor is as described in the first stage. Surgeon/Pathologist Verbiage (Will Incorporate Name Of Surgeon From Intro If Not Blank): operated in two distinct and integrated capacities as the surgeon and pathologist. Consent (Scalp)/Introductory Paragraph: The rationale for Mohs was explained to the patient and consent was obtained. The risks, benefits and alternatives to therapy were discussed in detail. Specifically, the risks of changes in hair growth pattern secondary to repair, infection, scarring, bleeding, prolonged wound healing, incomplete removal, allergy to anesthesia, nerve injury and recurrence were addressed. Prior to the procedure, the treatment site was clearly identified and confirmed by the patient. All components of Universal Protocol/PAUSE Rule completed. Consent (Temporal Branch)/Introductory Paragraph: The rationale for Mohs was explained to the patient and consent was obtained. The risks, benefits and alternatives to therapy were discussed in detail. Specifically, the risks of damage to the temporal branch of the facial nerve, infection, scarring, bleeding, prolonged wound healing, incomplete removal, allergy to anesthesia, and recurrence were addressed. Prior to the procedure, the treatment site was clearly identified and confirmed by the patient. All components of Universal Protocol/PAUSE Rule completed. Mart-1 - Negative Histology Text: MART-1 staining demonstrates a normal density and pattern of melanocytes along the dermal-epidermal junction. The surgical margins are negative for tumor cells. Hemostasis: Electrocautery Hatchet Flap Text: The defect edges were debeveled with a #15 scalpel blade.  Given the location of the defect, shape of the defect and the proximity to free margins a hatchet flap was deemed most appropriate.  Using a sterile surgical marker, an appropriate hatchet flap was drawn incorporating the defect and placing the expected incisions within the relaxed skin tension lines where possible.    The area thus outlined was incised deep to adipose tissue with a #15 scalpel blade.  The skin margins were undermined to an appropriate distance in all directions utilizing iris scissors. Double O-Z Flap Text: The defect edges were debeveled with a #15 scalpel blade.  Given the location of the defect, shape of the defect and the proximity to free margins a Double O-Z flap was deemed most appropriate.  Using a sterile surgical marker, an appropriate transposition flap was drawn incorporating the defect and placing the expected incisions within the relaxed skin tension lines where possible. The area thus outlined was incised deep to adipose tissue with a #15 scalpel blade.  The skin margins were undermined to an appropriate distance in all directions utilizing iris scissors. Epidermal Autograft Text: The defect edges were debeveled with a #15 scalpel blade.  Given the location of the defect, shape of the defect and the proximity to free margins an epidermal autograft was deemed most appropriate.  Using a sterile surgical marker, the primary defect shape was transferred to the donor site. The epidermal graft was then harvested.  The skin graft was then placed in the primary defect and oriented appropriately. Flap Type: Advancement Flap (Double) Dorsal Nasal Flap Text: The defect edges were debeveled with a #15 scalpel blade.  Given the location of the defect and the proximity to free margins a dorsal nasal flap was deemed most appropriate.  Using a sterile surgical marker, an appropriate dorsal nasal flap was drawn around the defect.    The area thus outlined was incised deep to adipose tissue with a #15 scalpel blade.  The skin margins were undermined to an appropriate distance in all directions utilizing iris scissors. Non-Graft Cartilage Fenestration Text: The cartilage was fenestrated with a 2mm punch biopsy to help facilitate healing. Helical Rim Advancement Flap Text: The defect edges were debeveled with a #15 blade scalpel.  Given the location of the defect and the proximity to free margins (helical rim) a double helical rim advancement flap was deemed most appropriate.  Using a sterile surgical marker, the appropriate advancement flaps were drawn incorporating the defect and placing the expected incisions between the helical rim and antihelix where possible.  The area thus outlined was incised through and through with a #15 scalpel blade.  With a skin hook and iris scissors, the flaps were gently and sharply undermined and freed up. Nasal Turnover Hinge Flap Text: The defect edges were debeveled with a #15 scalpel blade.  Given the size, depth, location of the defect and the defect being full thickness a nasal turnover hinge flap was deemed most appropriate.  Using a sterile surgical marker, an appropriate hinge flap was drawn incorporating the defect. The area thus outlined was incised with a #15 scalpel blade. The flap was designed to recreate the nasal mucosal lining and the alar rim. The skin margins were undermined to an appropriate distance in all directions utilizing iris scissors. O-Z Plasty Text: The defect edges were debeveled with a #15 scalpel blade.  Given the location of the defect, shape of the defect and the proximity to free margins an O-Z plasty (double transposition flap) was deemed most appropriate.  Using a sterile surgical marker, the appropriate transposition flaps were drawn incorporating the defect and placing the expected incisions within the relaxed skin tension lines where possible.    The area thus outlined was incised deep to adipose tissue with a #15 scalpel blade.  The skin margins were undermined to an appropriate distance in all directions utilizing iris scissors.  Hemostasis was achieved with electrocautery.  The flaps were then transposed into place, one clockwise and the other counterclockwise, and anchored with interrupted buried subcutaneous sutures. Paramedian Forehead Flap Text: A decision was made to reconstruct the defect utilizing an interpolation axial flap and a staged reconstruction.  A telfa template was made of the defect.  This telfa template was then used to outline the paramedian forehead pedicle flap.  The donor area for the pedicle flap was then injected with anesthesia.  The flap was excised through the skin and subcutaneous tissue down to the layer of the underlying musculature.  The pedicle flap was carefully excised within this deep plane to maintain its blood supply.  The edges of the donor site were undermined.   The donor site was closed in a primary fashion.  The pedicle was then rotated into position and sutured.  Once the tube was sutured into place, adequate blood supply was confirmed with blanching and refill.  The pedicle was then wrapped with xeroform gauze and dressed appropriately with a telfa and gauze bandage to ensure continued blood supply and protect the attached pedicle. Interpolation Flap Text: A decision was made to reconstruct the defect utilizing an interpolation axial flap and a staged reconstruction.  A telfa template was made of the defect.  This telfa template was then used to outline the interpolation flap.  The donor area for the pedicle flap was then injected with anesthesia.  The flap was excised through the skin and subcutaneous tissue down to the layer of the underlying musculature.  The interpolation flap was carefully excised within this deep plane to maintain its blood supply.  The edges of the donor site were undermined.   The donor site was closed in a primary fashion.  The pedicle was then rotated into position and sutured.  Once the tube was sutured into place, adequate blood supply was confirmed with blanching and refill.  The pedicle was then wrapped with xeroform gauze and dressed appropriately with a telfa and gauze bandage to ensure continued blood supply and protect the attached pedicle. Area H Indication Text: Tumors in this location are included in Area H (eyelids, eyebrows, nose, lips, chin, ear, pre-auricular, post-auricular, temple, genitalia, hands, feet, ankles and areola).  Tissue conservation is critical in these anatomic locations. Double Island Pedicle Flap Text: The defect edges were debeveled with a #15 scalpel blade.  Given the location of the defect, shape of the defect and the proximity to free margins a double island pedicle advancement flap was deemed most appropriate.  Using a sterile surgical marker, an appropriate advancement flap was drawn incorporating the defect, outlining the appropriate donor tissue and placing the expected incisions within the relaxed skin tension lines where possible.    The area thus outlined was incised deep to adipose tissue with a #15 scalpel blade.  The skin margins were undermined to an appropriate distance in all directions around the primary defect and laterally outward around the island pedicle utilizing iris scissors.  There was minimal undermining beneath the pedicle flap. Rotation Flap Text: The defect edges were debeveled with a #15 scalpel blade.  Given the location of the defect, shape of the defect and the proximity to free margins a rotation flap was deemed most appropriate.  Using a sterile surgical marker, an appropriate rotation flap was drawn incorporating the defect and placing the expected incisions within the relaxed skin tension lines where possible.    The area thus outlined was incised deep to adipose tissue with a #15 scalpel blade.  The skin margins were undermined to an appropriate distance in all directions utilizing iris scissors.

## 2023-03-29 ENCOUNTER — OFFICE VISIT (OUTPATIENT)
Dept: NEUROSURGERY | Facility: CLINIC | Age: 68
End: 2023-03-29
Payer: MEDICARE

## 2023-03-29 VITALS
BODY MASS INDEX: 23.86 KG/M2 | SYSTOLIC BLOOD PRESSURE: 145 MMHG | HEIGHT: 67 IN | DIASTOLIC BLOOD PRESSURE: 79 MMHG | RESPIRATION RATE: 18 BRPM | HEART RATE: 84 BPM | WEIGHT: 152 LBS

## 2023-03-29 DIAGNOSIS — M62.830 MUSCLE SPASM OF BACK: ICD-10-CM

## 2023-03-29 DIAGNOSIS — M54.50 ACUTE LEFT-SIDED LOW BACK PAIN WITHOUT SCIATICA: ICD-10-CM

## 2023-03-29 PROCEDURE — 99204 OFFICE O/P NEW MOD 45 MIN: CPT | Mod: S$PBB,,, | Performed by: PHYSICIAN ASSISTANT

## 2023-03-29 PROCEDURE — 99214 OFFICE O/P EST MOD 30 MIN: CPT | Mod: PBBFAC,25,PN | Performed by: PHYSICIAN ASSISTANT

## 2023-03-29 PROCEDURE — 99204 PR OFFICE/OUTPT VISIT, NEW, LEVL IV, 45-59 MIN: ICD-10-PCS | Mod: S$PBB,,, | Performed by: PHYSICIAN ASSISTANT

## 2023-03-29 PROCEDURE — 99999 PR PBB SHADOW E&M-EST. PATIENT-LVL IV: CPT | Mod: PBBFAC,,, | Performed by: PHYSICIAN ASSISTANT

## 2023-03-29 PROCEDURE — 96372 THER/PROPH/DIAG INJ SC/IM: CPT | Mod: PBBFAC,PN

## 2023-03-29 PROCEDURE — 99999 PR PBB SHADOW E&M-EST. PATIENT-LVL IV: ICD-10-PCS | Mod: PBBFAC,,, | Performed by: PHYSICIAN ASSISTANT

## 2023-03-29 RX ORDER — MELOXICAM 7.5 MG/1
7.5 TABLET ORAL DAILY PRN
Qty: 30 TABLET | Refills: 0 | Status: SHIPPED | OUTPATIENT
Start: 2023-03-29 | End: 2023-09-07 | Stop reason: ALTCHOICE

## 2023-03-29 RX ORDER — CYCLOBENZAPRINE HCL 10 MG
10 TABLET ORAL EVERY 8 HOURS PRN
Qty: 60 TABLET | Refills: 0 | Status: SHIPPED | OUTPATIENT
Start: 2023-03-29 | End: 2023-11-17 | Stop reason: ALTCHOICE

## 2023-03-29 RX ORDER — KETOROLAC TROMETHAMINE 30 MG/ML
30 INJECTION, SOLUTION INTRAMUSCULAR; INTRAVENOUS
Status: COMPLETED | OUTPATIENT
Start: 2023-03-29 | End: 2023-03-29

## 2023-03-29 RX ORDER — METHYLPREDNISOLONE ACETATE 40 MG/ML
40 INJECTION, SUSPENSION INTRA-ARTICULAR; INTRALESIONAL; INTRAMUSCULAR; SOFT TISSUE
Status: COMPLETED | OUTPATIENT
Start: 2023-03-29 | End: 2023-03-29

## 2023-03-29 RX ADMIN — METHYLPREDNISOLONE ACETATE 40 MG: 40 INJECTION, SUSPENSION INTRA-ARTICULAR; INTRALESIONAL; INTRAMUSCULAR; INTRASYNOVIAL; SOFT TISSUE at 12:03

## 2023-03-29 RX ADMIN — KETOROLAC TROMETHAMINE 30 MG: 30 INJECTION, SOLUTION INTRAMUSCULAR; INTRAVENOUS at 12:03

## 2023-03-29 NOTE — PROGRESS NOTES
"  [facility] Neurosurgery [facility]  Clinic Consult     Consult Requested By: Eleazar Soler MD  PCP: Jayant Castañeda MD    SUBJECTIVE:     Chief Complaint:   Chief Complaint   Patient presents with    Lumbar Spine Pain (L-Spine)     Patient present to clinic today as lumbar spine pain; states of "pain" when bending. Denies of numbness/tingling or weakness.        History of Present Illness:  Arleen Oconnell is a 68 y.o. female who presents for evaluation of acute low back pain. The pain is present in the low back. It does not radiate. She denies numbness, tingling or weakness in her legs. Denies bowel/bladder dysfunction. She was having difficulty standing erect due to the pain. The pain has improved some since onset.     Pertinent and recent history, provider evaluations, imaging and data reviewed in EPIC        Past Medical History:   Diagnosis Date    Anxiety     Asthma     seasonal    Constipation     Depression     Environmental allergies     Fibromyalgia     GERD (gastroesophageal reflux disease)     History of bronchitis     History of pneumonia     Inner ear anomaly     Insomnia     Migraines     PONV (postoperative nausea and vomiting)     Vertigo      Past Surgical History:   Procedure Laterality Date    BUNIONECTOMY Bilateral     CHOLECYSTECTOMY      CORRECTION OF HAMMER TOE Left 11/30/2020    Procedure: CORRECTION, HAMMER TOE;  Surgeon: Brian Cain MD;  Location: [facility];  Service: Orthopedics;  Laterality: Left;    HAND SURGERY Bilateral     basal joint repair    HYSTERECTOMY      JOINT REPLACEMENT Left 04/01/2014    Left TKA    KNEE ARTHROSCOPY Left 12/17/2013    Left knee scope    LAPAROSCOPIC CHOLECYSTECTOMY N/A 1/15/2020    Procedure: CHOLECYSTECTOMY, LAPAROSCOPIC;  Surgeon: Ian Lomax MD;  Location: [facility];  Service: General;  Laterality: N/A;    OPEN REDUCTION AND INTERNAL FIXATION (ORIF) OF FRACTURE OF METATARSAL BONE Left 11/30/2020    Procedure: ORIF, " FRACTURE, METATARSAL BONE;  Surgeon: Brian Cain MD;  Location: [facility] OR;  Service: Orthopedics;  Laterality: Left;  15142 is appropriate CPT     Family History   Problem Relation Age of Onset    Stroke Mother     Rheum arthritis Father     Cancer Sister         breast     Social History     Tobacco Use    Smoking status: Former     Types: Cigarettes     Quit date:      Years since quittin.2    Smokeless tobacco: Never   Substance Use Topics    Alcohol use: Not Currently     Alcohol/week: 3.0 - 4.0 standard drinks     Types: 3 - 4 Standard drinks or equivalent per week     Comment: rarely    Drug use: Never      Review of patient's allergies indicates:   Allergen Reactions    Neomycin-polymyxin-gramicidin     Zofran [ondansetron hcl (pf)]      Severe headache    Morphine Rash    Neosporin [benzalkonium chloride] Other (See Comments)     Blistering to area applied       Current Outpatient Medications:     albuterol (PROVENTIL/VENTOLIN HFA) 90 mcg/actuation inhaler, Inhale 2 puffs into the lungs 4 (four) times daily., Disp: , Rfl:     ascorbic acid, vitamin C, (VITAMIN C) 1000 MG tablet, Take 1,000 mg by mouth once daily., Disp: , Rfl:     buPROPion (WELLBUTRIN XL) 300 MG 24 hr tablet, TAKE ONE TABLET BY MOUTH EVERY DAY, Disp: 30 tablet, Rfl: 4    busPIRone (BUSPAR) 5 MG Tab, Take 1 tablet (5 mg total) by mouth 2 (two) times daily., Disp: 60 tablet, Rfl: 11    CALCIUM ORAL, Take 1 tablet by mouth once daily. , Disp: , Rfl:     cetirizine (ZYRTEC) 10 MG tablet, Take 10 mg by mouth once daily., Disp: , Rfl:     clonazePAM (KLONOPIN) 1 MG tablet, TAKE 1 TABLET BY MOUTH TWO TIMES A DAY, Disp: 60 tablet, Rfl: 3    cyclobenzaprine (FLEXERIL) 10 MG tablet, Take 1 tablet (10 mg total) by mouth every 12 (twelve) hours as needed for Muscle spasms., Disp: 40 tablet, Rfl: 0    eletriptan (RELPAX) 20 MG tablet, TAKE 1 TABLET BY MOUTH AS NEEDED FOR MIGRAINE**MAY REPEAT IN 2 HOURS IF NEEDED**, Disp: 4 tablet, Rfl: 0    " ergocalciferol, vitamin D2, (VITAMIN D ORAL), Take 10,000 mg by mouth once daily., Disp: , Rfl:     fluconazole (DIFLUCAN) 150 MG Tab, Take 150 mg by mouth as needed., Disp: , Rfl:     gabapentin (NEURONTIN) 300 MG capsule, TAKE 1 CAPSULE (300 MG TOTAL) BY MOUTH ONCE DAILY, Disp: 30 capsule, Rfl: 11    OMEPRAZOLE (PRILOSEC ORAL), Take 20 mg by mouth once daily. , Disp: , Rfl:     progesterone (PROMETRIUM) 200 MG capsule, Take 200 mg by mouth every evening. , Disp: , Rfl:     zinc gluconate 50 mg tablet, Take 50 mg by mouth once daily., Disp: , Rfl:     Current Facility-Administered Medications:     methylPREDNISolone sodium succinate injection 40 mg, 40 mg, Intravenous, Once PRN, MIMI Mendiola    ondansetron disintegrating tablet 4 mg, 4 mg, Oral, Once PRN, MIMI Mendiola    Review of Systems:   Constitutional: no fever, chills or night sweats. No changes in weight   Eyes: no visual changes   ENT: no nasal congestion or sore throat   Respiratory: no cough or shortness of breath   Cardiovascular: no chest pain or palpitations   Gastrointestinal: no nausea or vomiting   Genitourinary: no hematuria or dysuria   Integument/Breast: no rash or pruritis   Hematologic/Lymphatic: no easy bruising or lymphadenopathy   Musculoskeletal: +back pain, spasms   Neurological: no seizures or tremors   Behavioral/Psych: no auditory or visual hallucinations   Endocrine: no heat or cold intolerance         OBJECTIVE:     Vital Signs (Most Recent):  Pulse: 84 (03/29/23 1126)  Resp: 18 (03/29/23 1126)  BP: (!) 145/79 (03/29/23 1126)  Estimated body mass index is 23.81 kg/m² as calculated from the following:    Height as of this encounter: 5' 7" (1.702 m).    Weight as of this encounter: 68.9 kg (152 lb).    Physical Exam:   General: well developed, well nourished, no distress   Neurologic: Alert and oriented. Thought content appropriate. GCS 15.   Head: normocephalic, atraumatic  Eyes: EOMI  Neck: trachea midline, no JVD "   Cardiovascular: no LE edema  Pulmonary: normal respirations, no signs of respiratory distress  Abdomen:  non-distended  Sensory: intact to light touch throughout  Skin: Skin is warm, dry and intact    Motor Strength: Moves all extremities spontaneously with good tone. No abnormal movements seen.       Deltoids Triceps Biceps Wrist Extension Wrist Flexion Hand  Interossei   Upper: R 5/5 5/5 5/5 5/5 5/5 5/5 5/5    L 5/5 5/5 5/5 5/5 5/5 5/5 5/5     Iliopsoas Quadriceps Knee  Flexion Tibialis  anterior Gastro- cnemius EHL    Lower: R 5/5 5/5 5/5 5/5 5/5 5/5     L 5/5 5/5 5/5 5/5 5/5 5/5      DTR's: 2+ right patellar, 0 left patellar   Clonus: absent  Gait: normal           Able to walk on heels & toes          Diagnostic Results:  No imaging     ASSESSMENT/PLAN:     Acute left-sided low back pain without sciatica  -     Ambulatory referral/consult to Neurosurgery    Muscle spasm of back  -     cyclobenzaprine (FLEXERIL) 10 MG tablet; Take 1 tablet (10 mg total) by mouth every 8 (eight) hours as needed for Muscle spasms.  Dispense: 60 tablet; Refill: 0    Other orders  -     meloxicam (MOBIC) 7.5 MG tablet; Take 1 tablet (7.5 mg total) by mouth daily as needed for Pain.  Dispense: 30 tablet; Refill: 0  -     ketorolac injection 30 mg  -     methylPREDNISolone acetate injection 40 mg        Arleen Oconnell is a 68 y.o. female with acute low back pain. The pain is likely a spasm of the lumbar muscles. I have prescribed a short course of muscle relaxers and anti-inflammatories. We also provided IM depomedrol and toradol today in clinic. I will follow up in 2 weeks to assess progress. Will consider PT and imaging if pain persists.     Patient verbalized understanding of plan. Encouraged to call with any questions or concerns.     This note was partially dictated using voice recognition software, so please excuse any errors that were not corrected.

## 2023-04-18 ENCOUNTER — PATIENT MESSAGE (OUTPATIENT)
Dept: NEUROSURGERY | Facility: CLINIC | Age: 68
End: 2023-04-18
Payer: MEDICARE

## 2023-10-11 ENCOUNTER — PATIENT MESSAGE (OUTPATIENT)
Dept: ENDOCRINOLOGY | Facility: CLINIC | Age: 68
End: 2023-10-11
Payer: MEDICARE

## 2023-10-31 ENCOUNTER — TELEPHONE (OUTPATIENT)
Dept: HEMATOLOGY/ONCOLOGY | Facility: CLINIC | Age: 68
End: 2023-10-31
Payer: MEDICARE

## 2023-10-31 NOTE — TELEPHONE ENCOUNTER
----- Message from Yvonne Breen, Patient Care Assistant sent at 10/31/2023  9:46 AM CDT -----  Type: Needs Medical Advice  Who Called:  Arleen   Sachin Call Back Number: [phone]    Additional Information: Arleen has referral and is ready to schedule , please call to further  discuss, thank you

## 2023-10-31 NOTE — TELEPHONE ENCOUNTER
Returned call to pt.  Hem referral in for Leukocytosis.  Scheduled pt w/ first avail sobeida hem appt with Dr Duvall on Dec 18th.  Pt confirmed the appt date time and location.

## 2023-11-02 ENCOUNTER — TELEPHONE (OUTPATIENT)
Dept: HEMATOLOGY/ONCOLOGY | Facility: CLINIC | Age: 68
End: 2023-11-02
Payer: MEDICARE

## 2023-11-02 NOTE — NURSING
Patient returned my call and accepted appt on Wednesday, Nov 8th at 0800.  Per Dr. Duvall, it is okay to be a 40 min new visit.   Date, time, and location confirmed.

## 2023-11-02 NOTE — NURSING
Reached out to patient to reschedule her visit with Dr. Duvall to a sooner appt date.   LVM with contact information.      Loss of subcutaneous fat.../Loss of muscle...

## 2023-11-08 ENCOUNTER — PATIENT MESSAGE (OUTPATIENT)
Dept: HEMATOLOGY/ONCOLOGY | Facility: CLINIC | Age: 68
End: 2023-11-08
Payer: MEDICARE

## 2023-11-08 ENCOUNTER — OFFICE VISIT (OUTPATIENT)
Dept: HEMATOLOGY/ONCOLOGY | Facility: CLINIC | Age: 68
End: 2023-11-08
Payer: MEDICARE

## 2023-11-08 ENCOUNTER — LAB VISIT (OUTPATIENT)
Dept: LAB | Facility: HOSPITAL | Age: 68
End: 2023-11-08
Attending: INTERNAL MEDICINE
Payer: MEDICARE

## 2023-11-08 VITALS
BODY MASS INDEX: 24.56 KG/M2 | RESPIRATION RATE: 12 BRPM | WEIGHT: 156.5 LBS | HEIGHT: 67 IN | HEART RATE: 90 BPM | DIASTOLIC BLOOD PRESSURE: 88 MMHG | TEMPERATURE: 97 F | SYSTOLIC BLOOD PRESSURE: 136 MMHG | OXYGEN SATURATION: 97 %

## 2023-11-08 DIAGNOSIS — D72.9 NEUTROPHILIA: ICD-10-CM

## 2023-11-08 DIAGNOSIS — R79.9 ABNORMAL FINDING OF BLOOD CHEMISTRY, UNSPECIFIED: ICD-10-CM

## 2023-11-08 DIAGNOSIS — R10.9 ABDOMINAL PAIN, UNSPECIFIED ABDOMINAL LOCATION: ICD-10-CM

## 2023-11-08 DIAGNOSIS — L90.0 LICHEN SCLEROSUS: ICD-10-CM

## 2023-11-08 DIAGNOSIS — D72.829 LEUKOCYTOSIS, UNSPECIFIED TYPE: Primary | ICD-10-CM

## 2023-11-08 DIAGNOSIS — R11.0 NAUSEA: ICD-10-CM

## 2023-11-08 DIAGNOSIS — D72.829 LEUKOCYTOSIS, UNSPECIFIED TYPE: ICD-10-CM

## 2023-11-08 LAB
ALBUMIN SERPL BCP-MCNC: 4.1 G/DL (ref 3.5–5.2)
ALP SERPL-CCNC: 64 U/L (ref 55–135)
ALT SERPL W/O P-5'-P-CCNC: 15 U/L (ref 10–44)
ANION GAP SERPL CALC-SCNC: 10 MMOL/L (ref 8–16)
AST SERPL-CCNC: 14 U/L (ref 10–40)
BASOPHILS # BLD AUTO: 0.14 K/UL (ref 0–0.2)
BASOPHILS NFR BLD: 1.2 % (ref 0–1.9)
BILIRUB SERPL-MCNC: 0.9 MG/DL (ref 0.1–1)
BILIRUB UR QL STRIP: NEGATIVE
BUN SERPL-MCNC: 14 MG/DL (ref 8–23)
CALCIUM SERPL-MCNC: 9.7 MG/DL (ref 8.7–10.5)
CHLORIDE SERPL-SCNC: 106 MMOL/L (ref 95–110)
CLARITY UR: CLEAR
CO2 SERPL-SCNC: 23 MMOL/L (ref 23–29)
COLOR UR: YELLOW
CREAT SERPL-MCNC: 1 MG/DL (ref 0.5–1.4)
CRP SERPL-MCNC: 7.3 MG/L (ref 0–8.2)
CRP SERPL-MCNC: 7.3 MG/L (ref 0–8.2)
DIFFERENTIAL METHOD: ABNORMAL
EOSINOPHIL # BLD AUTO: 0.2 K/UL (ref 0–0.5)
EOSINOPHIL NFR BLD: 2.1 % (ref 0–8)
ERYTHROCYTE [DISTWIDTH] IN BLOOD BY AUTOMATED COUNT: 12.6 % (ref 11.5–14.5)
EST. GFR  (NO RACE VARIABLE): >60 ML/MIN/1.73 M^2
FERRITIN SERPL-MCNC: 109 NG/ML (ref 20–300)
GLUCOSE SERPL-MCNC: 94 MG/DL (ref 70–110)
GLUCOSE UR QL STRIP: NEGATIVE
HCT VFR BLD AUTO: 44.8 % (ref 37–48.5)
HGB BLD-MCNC: 14.6 G/DL (ref 12–16)
HGB UR QL STRIP: NEGATIVE
IMM GRANULOCYTES # BLD AUTO: 0.06 K/UL (ref 0–0.04)
IMM GRANULOCYTES NFR BLD AUTO: 0.5 % (ref 0–0.5)
IRON SERPL-MCNC: 115 UG/DL (ref 30–160)
KETONES UR QL STRIP: NEGATIVE
LDH SERPL L TO P-CCNC: 146 U/L (ref 110–260)
LEUKOCYTE ESTERASE UR QL STRIP: NEGATIVE
LYMPHOCYTES # BLD AUTO: 3.8 K/UL (ref 1–4.8)
LYMPHOCYTES NFR BLD: 32.8 % (ref 18–48)
MCH RBC QN AUTO: 29.8 PG (ref 27–31)
MCHC RBC AUTO-ENTMCNC: 32.6 G/DL (ref 32–36)
MCV RBC AUTO: 91 FL (ref 82–98)
MONOCYTES # BLD AUTO: 0.7 K/UL (ref 0.3–1)
MONOCYTES NFR BLD: 6.2 % (ref 4–15)
NEUTROPHILS # BLD AUTO: 6.6 K/UL (ref 1.8–7.7)
NEUTROPHILS NFR BLD: 57.2 % (ref 38–73)
NITRITE UR QL STRIP: NEGATIVE
NRBC BLD-RTO: 0 /100 WBC
PATH REV BLD -IMP: NORMAL
PH UR STRIP: 6 [PH] (ref 5–8)
PLATELET # BLD AUTO: 393 K/UL (ref 150–450)
PMV BLD AUTO: 10.3 FL (ref 9.2–12.9)
POTASSIUM SERPL-SCNC: 4.1 MMOL/L (ref 3.5–5.1)
PROT SERPL-MCNC: 8.4 G/DL (ref 6–8.4)
PROT UR QL STRIP: NEGATIVE
RBC # BLD AUTO: 4.9 M/UL (ref 4–5.4)
SATURATED IRON: 21 % (ref 20–50)
SODIUM SERPL-SCNC: 139 MMOL/L (ref 136–145)
SP GR UR STRIP: 1.02 (ref 1–1.03)
TOTAL IRON BINDING CAPACITY: 537 UG/DL (ref 250–450)
TRANSFERRIN SERPL-MCNC: 363 MG/DL (ref 200–375)
URATE SERPL-MCNC: 3.5 MG/DL (ref 2.4–5.7)
URN SPEC COLLECT METH UR: NORMAL
WBC # BLD AUTO: 11.52 K/UL (ref 3.9–12.7)

## 2023-11-08 PROCEDURE — 84550 ASSAY OF BLOOD/URIC ACID: CPT | Mod: PN | Performed by: INTERNAL MEDICINE

## 2023-11-08 PROCEDURE — 85025 COMPLETE CBC W/AUTO DIFF WBC: CPT | Mod: PN | Performed by: INTERNAL MEDICINE

## 2023-11-08 PROCEDURE — 81003 URINALYSIS AUTO W/O SCOPE: CPT | Mod: PN | Performed by: INTERNAL MEDICINE

## 2023-11-08 PROCEDURE — 85060 PATHOLOGIST REVIEW: ICD-10-PCS | Mod: ,,, | Performed by: PATHOLOGY

## 2023-11-08 PROCEDURE — 99999 PR PBB SHADOW E&M-EST. PATIENT-LVL V: CPT | Mod: PBBFAC,,, | Performed by: INTERNAL MEDICINE

## 2023-11-08 PROCEDURE — 80053 COMPREHEN METABOLIC PANEL: CPT | Mod: PN | Performed by: INTERNAL MEDICINE

## 2023-11-08 PROCEDURE — 99205 OFFICE O/P NEW HI 60 MIN: CPT | Mod: S$PBB,,, | Performed by: INTERNAL MEDICINE

## 2023-11-08 PROCEDURE — 85060 BLOOD SMEAR INTERPRETATION: CPT | Mod: ,,, | Performed by: PATHOLOGY

## 2023-11-08 PROCEDURE — 99205 PR OFFICE/OUTPT VISIT, NEW, LEVL V, 60-74 MIN: ICD-10-PCS | Mod: S$PBB,,, | Performed by: INTERNAL MEDICINE

## 2023-11-08 PROCEDURE — 83540 ASSAY OF IRON: CPT | Performed by: INTERNAL MEDICINE

## 2023-11-08 PROCEDURE — 86140 C-REACTIVE PROTEIN: CPT | Performed by: INTERNAL MEDICINE

## 2023-11-08 PROCEDURE — 84466 ASSAY OF TRANSFERRIN: CPT | Performed by: INTERNAL MEDICINE

## 2023-11-08 PROCEDURE — 86038 ANTINUCLEAR ANTIBODIES: CPT | Performed by: INTERNAL MEDICINE

## 2023-11-08 PROCEDURE — 36415 COLL VENOUS BLD VENIPUNCTURE: CPT | Mod: PN | Performed by: INTERNAL MEDICINE

## 2023-11-08 PROCEDURE — 82728 ASSAY OF FERRITIN: CPT | Performed by: INTERNAL MEDICINE

## 2023-11-08 PROCEDURE — 99999 PR PBB SHADOW E&M-EST. PATIENT-LVL V: ICD-10-PCS | Mod: PBBFAC,,, | Performed by: INTERNAL MEDICINE

## 2023-11-08 PROCEDURE — 99215 OFFICE O/P EST HI 40 MIN: CPT | Mod: PBBFAC,PN | Performed by: INTERNAL MEDICINE

## 2023-11-08 PROCEDURE — 83615 LACTATE (LD) (LDH) ENZYME: CPT | Mod: PN | Performed by: INTERNAL MEDICINE

## 2023-11-08 RX ORDER — ONDANSETRON 4 MG/1
4 TABLET, ORALLY DISINTEGRATING ORAL EVERY 8 HOURS PRN
Qty: 30 TABLET | Refills: 6 | Status: SHIPPED | OUTPATIENT
Start: 2023-11-08

## 2023-11-08 RX ORDER — CYANOCOBALAMIN (VITAMIN B-12) 2500 MCG
5000 TABLET, SUBLINGUAL SUBLINGUAL
COMMUNITY

## 2023-11-08 RX ORDER — ACETAMINOPHEN 500 MG
1 TABLET ORAL DAILY
COMMUNITY

## 2023-11-08 NOTE — PROGRESS NOTES
PATIENT: Arleen Oconnell  MRN: [id number]  DATE: 11/8/2023      Diagnosis:   1. Leukocytosis, unspecified type    2. Lichen sclerosus    3. Abdominal pain, unspecified abdominal location    4. Abnormal finding of blood chemistry, unspecified    5. Neutrophilia    6. Nausea        Chief Complaint: Establish Care (New pt; referred for leukocytosis )      Oncologic History:      Oncologic History     Oncologic Treatment     Pathology           Subjective:    Initial History: Ms. Oconnell is a 68 y.o. female with Anxiety, asthma, depression, fibromyalgia, GERD, insomnia, migraines, vertigo, lichen sclerosus s/p laser treatment who presents for leukocytosis.  The patient underwent biopsy of the labia in the past showing lichen sclerosis.  Pt is under the care of Dr Savannah Salazar with Gynecology.  Pt states she underwent laser treatment for lichen sclerosis 9/08/23 and 10/20/23.  The patient's WBC has been elevated since at least 10/04/23 with neutrophilia.  Currently the patient endorses decreased appetite with early satiety.  She is not had any weight loss.  The patient endorses fatigue, occasional congestion, occasional cough, shortness of breath with minimal activity.  The patient endorses lower abdominal pain and states she feels she may have a urinary tract infection.  She denies any dysuria, cloudy urine, or fever.  The patient endorses occasional nausea, chronic back pain, occasional dizziness and headaches, anxiety.  The patient denies vomiting, constipation, diarrhea.  The patient denies fever, chills, night sweats, new lumps or bumps, easy bruising or bleeding.    Past Medical History:   Past Medical History:   Diagnosis Date    Anxiety     Asthma     seasonal    Constipation     Depression     Environmental allergies     Fibromyalgia     GERD (gastroesophageal reflux disease)     History of bronchitis     History of pneumonia     Inner ear anomaly     Insomnia     Migraines     PONV (postoperative nausea and  vomiting)     Vertigo        Past Surgical HIstory:   Past Surgical History:   Procedure Laterality Date    BUNIONECTOMY Bilateral     CHOLECYSTECTOMY      CORRECTION OF HAMMER TOE Left 11/30/2020    Procedure: CORRECTION, HAMMER TOE;  Surgeon: Brian Cain MD;  Location: [facility];  Service: Orthopedics;  Laterality: Left;    HAND SURGERY Bilateral     basal joint repair    HYSTERECTOMY      JOINT REPLACEMENT Left 04/01/2014    Left TKA    KNEE ARTHROSCOPY Left 12/17/2013    Left knee scope    LAPAROSCOPIC CHOLECYSTECTOMY N/A 1/15/2020    Procedure: CHOLECYSTECTOMY, LAPAROSCOPIC;  Surgeon: Ian Lomax MD;  Location: [facility];  Service: General;  Laterality: N/A;    OPEN REDUCTION AND INTERNAL FIXATION (ORIF) OF FRACTURE OF METATARSAL BONE Left 11/30/2020    Procedure: ORIF, FRACTURE, METATARSAL BONE;  Surgeon: Brian Cain MD;  Location: [facility];  Service: Orthopedics;  Laterality: Left;  26997 is appropriate CPT       Family History:   Family History   Problem Relation Age of Onset    Stroke Mother     Rheum arthritis Father     Breast cancer Sister     Breast cancer Sister        Social History:  reports that she quit smoking about 27 years ago. Her smoking use included cigarettes. She has never used smokeless tobacco. She reports that she does not currently use alcohol after a past usage of about 3.0 - 4.0 standard drinks of alcohol per week. She reports that she does not use drugs.    Allergies:  Review of patient's allergies indicates:   Allergen Reactions    Neomycin-polymyxin-gramicidin     Zofran [ondansetron hcl (pf)]      Severe headache    Morphine Rash    Neosporin [benzalkonium chloride] Other (See Comments)     Blistering to area applied       Medications:  Current Outpatient Medications   Medication Sig Dispense Refill    albuterol (PROVENTIL/VENTOLIN HFA) 90 mcg/actuation inhaler Inhale 2 puffs into the lungs 4 (four) times daily.      ascorbic acid, vitamin C, (VITAMIN C) 1000 MG  tablet Take 1,000 mg by mouth once daily.      biotin 5,000 mcg Subl Place 5,000 mcg under the tongue.      buPROPion (WELLBUTRIN XL) 300 MG 24 hr tablet TAKE ONE TABLET BY MOUTH EVERY DAY 30 tablet 4    busPIRone (BUSPAR) 5 MG Tab Take 1 tablet (5 mg total) by mouth 2 (two) times daily. 60 tablet 11    CALCIUM ORAL Take 1 tablet by mouth once daily.       cetirizine (ZYRTEC) 10 MG tablet Take 10 mg by mouth once daily.      cholecalciferol, vitamin D3, 125 mcg (5,000 unit) Tab Take 1 tablet by mouth once daily.      clonazePAM (KLONOPIN) 1 MG tablet TAKE 1 TABLET BY MOUTH TWO TIMES A DAY. 60 tablet 3    ergocalciferol, vitamin D2, (VITAMIN D ORAL) Take 10,000 mg by mouth once daily.      gabapentin (NEURONTIN) 300 MG capsule TAKE 1 CAPSULE (300 MG TOTAL) BY MOUTH ONCE DAILY 30 capsule 11    OMEPRAZOLE (PRILOSEC ORAL) Take 20 mg by mouth once daily.       progesterone (PROMETRIUM) 200 MG capsule Take 200 mg by mouth every evening.       sodium chloride (OCEAN NASAL) 0.65 % nasal spray 1 spray by Nasal route as needed for Congestion. 45 mL 3    zinc gluconate 50 mg tablet Take 50 mg by mouth once daily.      cyclobenzaprine (FLEXERIL) 10 MG tablet Take 1 tablet (10 mg total) by mouth every 8 (eight) hours as needed for Muscle spasms. (Patient not taking: Reported on 11/8/2023) 60 tablet 0    doxycycline (VIBRA-TABS) 100 MG tablet Take 1 tablet (100 mg total) by mouth 2 (two) times daily. (Patient not taking: Reported on 11/8/2023) 14 tablet 0    ondansetron (ZOFRAN-ODT) 4 MG TbDL Take 1 tablet (4 mg total) by mouth every 8 (eight) hours as needed. 30 tablet 6     No current facility-administered medications for this visit.       Review of Systems   Constitutional:  Positive for appetite change and fatigue. Negative for chills, fever and unexpected weight change.   HENT:  Positive for congestion. Negative for sore throat and trouble swallowing.    Eyes:  Positive for visual disturbance (needed new prescription).  "Negative for photophobia and pain.   Respiratory:  Positive for cough (occasional) and shortness of breath (with minimal activity). Negative for chest tightness.    Cardiovascular:  Negative for chest pain, palpitations and leg swelling.   Gastrointestinal:  Positive for abdominal pain (lower abdomen) and nausea. Negative for constipation, diarrhea and vomiting.   Genitourinary:  Negative for difficulty urinating, dysuria and frequency.   Musculoskeletal:  Positive for back pain. Negative for arthralgias.   Skin:  Negative for color change and rash.   Neurological:  Positive for dizziness (on occcasion) and headaches. Negative for weakness and numbness.   Hematological:  Negative for adenopathy. Does not bruise/bleed easily.   Psychiatric/Behavioral:  The patient is nervous/anxious.        ECOG Performance Status: 1   Objective:      Vitals:   Vitals:    11/08/23 0754   BP: 136/88   BP Location: Right arm   Patient Position: Sitting   BP Method: Medium (Manual)   Pulse: 90   Resp: 12   Temp: 96.6 °F (35.9 °C)   TempSrc: Temporal   SpO2: 97%   Weight: 71 kg (156 lb 8.4 oz)   Height: 5' 7" (1.702 m)     Physical Exam  Constitutional:       General: She is not in acute distress.     Appearance: She is well-developed. She is not diaphoretic.   HENT:      Head: Normocephalic and atraumatic.   Eyes:      General: No scleral icterus.        Right eye: No discharge.         Left eye: No discharge.   Cardiovascular:      Rate and Rhythm: Normal rate and regular rhythm.      Heart sounds: Normal heart sounds. No murmur heard.     No friction rub. No gallop.   Pulmonary:      Effort: Pulmonary effort is normal. No respiratory distress.      Breath sounds: Normal breath sounds. No wheezing or rales.   Chest:      Chest wall: No tenderness.   Abdominal:      General: Bowel sounds are normal. There is no distension.      Palpations: Abdomen is soft. There is no mass.      Tenderness: There is no abdominal tenderness. There is no " guarding or rebound.   Musculoskeletal:         General: No tenderness. Normal range of motion.   Lymphadenopathy:      Cervical: No cervical adenopathy.      Upper Body:      Right upper body: No supraclavicular or axillary adenopathy.      Left upper body: No supraclavicular or axillary adenopathy.   Skin:     Findings: No erythema or rash.   Neurological:      Mental Status: She is alert and oriented to person, place, and time.   Psychiatric:         Behavior: Behavior normal.         Laboratory Data:  Lab Visit on 11/08/2023   Component Date Value Ref Range Status    Specimen UA 11/08/2023 Urine, Clean Catch   Final    Color, UA 11/08/2023 Yellow  Yellow, Straw, Stefania Final    Appearance, UA 11/08/2023 Clear  Clear Final    pH, UA 11/08/2023 6.0  5.0 - 8.0 Final    Specific Gravity, UA 11/08/2023 1.025  1.005 - 1.030 Final    Protein, UA 11/08/2023 Negative  Negative Final    Comment: Recommend a 24 hour urine protein or a urine   protein/creatinine ratio if globulin induced proteinuria is  clinically suspected.      Glucose, UA 11/08/2023 Negative  Negative Final    Ketones, UA 11/08/2023 Negative  Negative Final    Bilirubin (UA) 11/08/2023 Negative  Negative Final    Occult Blood UA 11/08/2023 Negative  Negative Final    Nitrite, UA 11/08/2023 Negative  Negative Final    Leukocytes, UA 11/08/2023 Negative  Negative Final    WBC 11/08/2023 11.52  3.90 - 12.70 K/uL Final    RBC 11/08/2023 4.90  4.00 - 5.40 M/uL Final    Hemoglobin 11/08/2023 14.6  12.0 - 16.0 g/dL Final    Hematocrit 11/08/2023 44.8  37.0 - 48.5 % Final    MCV 11/08/2023 91  82 - 98 fL Final    MCH 11/08/2023 29.8  27.0 - 31.0 pg Final    MCHC 11/08/2023 32.6  32.0 - 36.0 g/dL Final    RDW 11/08/2023 12.6  11.5 - 14.5 % Final    Platelets 11/08/2023 393  150 - 450 K/uL Final    MPV 11/08/2023 10.3  9.2 - 12.9 fL Final    Immature Granulocytes 11/08/2023 0.5  0.0 - 0.5 % Final    Gran # (ANC) 11/08/2023 6.6  1.8 - 7.7 K/uL Final    Immature  Grans (Abs) 11/08/2023 0.06 (H)  0.00 - 0.04 K/uL Final    Comment: Mild elevation in immature granulocytes is non specific and   can be seen in a variety of conditions including stress response,   acute inflammation, trauma and pregnancy. Correlation with other   laboratory and clinical findings is essential.      Lymph # 11/08/2023 3.8  1.0 - 4.8 K/uL Final    Mono # 11/08/2023 0.7  0.3 - 1.0 K/uL Final    Eos # 11/08/2023 0.2  0.0 - 0.5 K/uL Final    Baso # 11/08/2023 0.14  0.00 - 0.20 K/uL Final    nRBC 11/08/2023 0  0 /100 WBC Final    Gran % 11/08/2023 57.2  38.0 - 73.0 % Final    Lymph % 11/08/2023 32.8  18.0 - 48.0 % Final    Mono % 11/08/2023 6.2  4.0 - 15.0 % Final    Eosinophil % 11/08/2023 2.1  0.0 - 8.0 % Final    Basophil % 11/08/2023 1.2  0.0 - 1.9 % Final    Differential Method 11/08/2023 Automated   Final    Sodium 11/08/2023 139  136 - 145 mmol/L Final    Potassium 11/08/2023 4.1  3.5 - 5.1 mmol/L Final    Chloride 11/08/2023 106  95 - 110 mmol/L Final    CO2 11/08/2023 23  23 - 29 mmol/L Final    Glucose 11/08/2023 94  70 - 110 mg/dL Final    BUN 11/08/2023 14  8 - 23 mg/dL Final    Creatinine 11/08/2023 1.0  0.5 - 1.4 mg/dL Final    Calcium 11/08/2023 9.7  8.7 - 10.5 mg/dL Final    Total Protein 11/08/2023 8.4  6.0 - 8.4 g/dL Final    Albumin 11/08/2023 4.1  3.5 - 5.2 g/dL Final    Total Bilirubin 11/08/2023 0.9  0.1 - 1.0 mg/dL Final    Comment: For infants and newborns, interpretation of results should be based  on gestational age, weight and in agreement with clinical  observations.    Premature Infant recommended reference ranges:  Up to 24 hours.............<8.0 mg/dL  Up to 48 hours............<12.0 mg/dL  3-5 days..................<15.0 mg/dL  6-29 days.................<15.0 mg/dL      Alkaline Phosphatase 11/08/2023 64  55 - 135 U/L Final    AST 11/08/2023 14  10 - 40 U/L Final    ALT 11/08/2023 15  10 - 44 U/L Final    eGFR 11/08/2023 >60.0  >60 mL/min/1.73 m^2 Final    Anion Gap  11/08/2023 10  8 - 16 mmol/L Final    LD 11/08/2023 146  110 - 260 U/L Final    Results are increased in hemolyzed samples.    Uric Acid 11/08/2023 3.5  2.4 - 5.7 mg/dL Final         Imaging:     CT Abdomen and Pelvis 10/04/23    Base of Chest:     There is no dense lobar consolidation or pleural effusion.  Included cardiac chambers are within normal limits.     Liver, pancreas, spleen, gallbladder:     There is no acute abnormality of the liver, pancreas or spleen.  There are no inflammatory changes in the gallbladder fossa.  Cholecystectomy clips.     Kidneys:     There is no obstructing urinary tract calculus, hydronephrosis or hydroureter. The adrenal glands are not enlarged.     Bowel:     The lack of oral contrast limits evaluation of the bowel.  There is no acute abnormality of the stomach. There is no small bowel dilatation.  There is no acute abnormality of the colon.  A few scattered diverticular seen in the colon.  The rectum is within normal limits.  The appendix is not clearly visualized.     Other:     There is no free fluid, free air or pathologic lymph node enlargement. The IVC is within normal limits.  There is no aneurysmal dilatation of the abdominal aorta.     Pelvis:     The urinary bladder is within normal limits. There is no significant free fluid in the pelvis.  The uterus and ovaries are not clearly visualized.     Body wall:     There is no acute body wall finding.       Assessment:       1. Leukocytosis, unspecified type    2. Lichen sclerosus    3. Abdominal pain, unspecified abdominal location    4. Abnormal finding of blood chemistry, unspecified    5. Neutrophilia    6. Nausea           Plan:     Leukocytosis - Pt with a persistent leukocytosis after starting on laser treatment for lichen slcerosis 9/08/23  -CBC repeated today showing normal WBC  -Pathologist to review smear  -CRP and ferritin to assess for inflammation  -CHIARA to screen for autoimmune conditions  -LDH Uric acid to  screen for hematologic malignancies    Lichen Slcerosus - pt under the care of gynecologist Dr Savannah Salazar with Gynecology  -Pt underwent laser treatment for lichen sclerosis 9/08/23 and 10/20/23 which may have prompted leukocytosis  -Management per gynecology    Abdominal Pain - UA done today negative for UTI  -CT abdomen and pelvis 10/04/23 showed no acute pathology  -Would recommend return appt with gynecologist if symptoms persist    Nausea - Prescription for Zofran sent    Route Chart for Scheduling    Med Onc Chart Routing      Follow up with physician 1 week. Pt needs a urinalysis and labs today with an appt with me next week.   Follow up with SINDI    Infusion scheduling note    Injection scheduling note    Labs    Imaging    Pharmacy appointment    Other referrals                     Leonides Duvall MD  [facility]  Hematology and Oncology  [facility] [address] [place] [address]   O: [phone]  F: [phone]

## 2023-11-09 LAB
ANA SER QL IF: NORMAL
PATH REV BLD -IMP: NORMAL

## 2023-11-16 NOTE — PROGRESS NOTES
PATIENT: Arleen Oconnell  MRN: [id number]  DATE: 11/18/2023      Diagnosis:   1. Leukocytosis, unspecified type    2. Lichen sclerosus    3. Fatigue, unspecified type          Chief Complaint: Leukocytosis, unspecified type (1 follow up )      Oncologic History:      Oncologic History     Oncologic Treatment     Pathology           Subjective:    Interval History: Ms. Oconnell is a 68 y.o. female with Anxiety, asthma, depression, fibromyalgia, GERD, insomnia, migraines, vertigo, lichen sclerosus s/p laser treatment who presents for leukocytosis.  Since the last clinic visit the patient underwent labs on 11/08/2023 showed normal white blood cell count at 11.52 with mildly elevated immature granulocytes at 0.06 k/uL.  Pathologist review showed relatively unremarkable white blood cells with rare immature granulocytes.  No blasts were seen.  CHIARA was negative. LDH, uric acid, CRP, and ferritin were normal.    Pt endorses persistent fatigue.  The patient denies CP, cough, SOB, abdominal pain, nausea, vomiting, constipation, diarrhea.  The patient denies fever, chills, night sweats, weight loss, new lumps or bumps, easy bruising or bleeding.    Prior History:  The patient underwent biopsy of the labia in the past showing lichen sclerosis.  Pt is under the care of Dr Savannah Salazar with Gynecology.  Pt states she underwent laser treatment for lichen sclerosis 9/08/23 and 10/20/23.  The patient's WBC has been elevated since at least 10/04/23 with neutrophilia.    Past Medical History:   Past Medical History:   Diagnosis Date    Anxiety     Asthma     seasonal    Constipation     Depression     Environmental allergies     Fibromyalgia     GERD (gastroesophageal reflux disease)     History of bronchitis     History of pneumonia     Inner ear anomaly     Insomnia     Migraines     PONV (postoperative nausea and vomiting)     Vertigo        Past Surgical HIstory:   Past Surgical History:   Procedure Laterality Date    BUNIONECTOMY  Bilateral     CHOLECYSTECTOMY      CORRECTION OF HAMMER TOE Left 11/30/2020    Procedure: CORRECTION, HAMMER TOE;  Surgeon: Brian Cain MD;  Location: [facility];  Service: Orthopedics;  Laterality: Left;    HAND SURGERY Bilateral     basal joint repair    HYSTERECTOMY      JOINT REPLACEMENT Left 04/01/2014    Left TKA    KNEE ARTHROSCOPY Left 12/17/2013    Left knee scope    LAPAROSCOPIC CHOLECYSTECTOMY N/A 1/15/2020    Procedure: CHOLECYSTECTOMY, LAPAROSCOPIC;  Surgeon: Ian Lomax MD;  Location: [facility];  Service: General;  Laterality: N/A;    OPEN REDUCTION AND INTERNAL FIXATION (ORIF) OF FRACTURE OF METATARSAL BONE Left 11/30/2020    Procedure: ORIF, FRACTURE, METATARSAL BONE;  Surgeon: Brian Cain MD;  Location: [facility];  Service: Orthopedics;  Laterality: Left;  52544 is appropriate CPT       Family History:   Family History   Problem Relation Age of Onset    Stroke Mother     Rheum arthritis Father     Breast cancer Sister     Breast cancer Sister        Social History:  reports that she quit smoking about 27 years ago. Her smoking use included cigarettes. She has never used smokeless tobacco. She reports that she does not currently use alcohol after a past usage of about 3.0 - 4.0 standard drinks of alcohol per week. She reports that she does not use drugs.    Allergies:  Review of patient's allergies indicates:   Allergen Reactions    Neomycin-polymyxin-gramicidin     Zofran [ondansetron hcl (pf)]      Severe headache    Morphine Rash    Neosporin [benzalkonium chloride] Other (See Comments)     Blistering to area applied       Medications:  Current Outpatient Medications   Medication Sig Dispense Refill    albuterol (PROVENTIL/VENTOLIN HFA) 90 mcg/actuation inhaler Inhale 2 puffs into the lungs 4 (four) times daily.      ascorbic acid, vitamin C, (VITAMIN C) 1000 MG tablet Take 1,000 mg by mouth once daily.      biotin 5,000 mcg Subl Place 5,000 mcg under the tongue.      buPROPion  (WELLBUTRIN XL) 300 MG 24 hr tablet TAKE ONE TABLET BY MOUTH EVERY DAY 30 tablet 4    busPIRone (BUSPAR) 5 MG Tab Take 1 tablet (5 mg total) by mouth 2 (two) times daily. 60 tablet 11    CALCIUM ORAL Take 1 tablet by mouth once daily.       cetirizine (ZYRTEC) 10 MG tablet Take 10 mg by mouth once daily.      cholecalciferol, vitamin D3, 125 mcg (5,000 unit) Tab Take 1 tablet by mouth once daily.      clonazePAM (KLONOPIN) 1 MG tablet TAKE 1 TABLET BY MOUTH TWO TIMES A DAY. 60 tablet 3    ergocalciferol, vitamin D2, (VITAMIN D ORAL) Take 10,000 mg by mouth once daily.      gabapentin (NEURONTIN) 300 MG capsule TAKE 1 CAPSULE (300 MG TOTAL) BY MOUTH ONCE DAILY 30 capsule 11    OMEPRAZOLE (PRILOSEC ORAL) Take 20 mg by mouth once daily.       ondansetron (ZOFRAN-ODT) 4 MG TbDL Take 1 tablet (4 mg total) by mouth every 8 (eight) hours as needed. 30 tablet 6    progesterone (PROMETRIUM) 200 MG capsule Take 200 mg by mouth every evening.       sodium chloride (OCEAN NASAL) 0.65 % nasal spray 1 spray by Nasal route as needed for Congestion. 45 mL 3    zinc gluconate 50 mg tablet Take 50 mg by mouth once daily.       No current facility-administered medications for this visit.       Review of Systems   Constitutional:  Positive for fatigue. Negative for chills, fever and unexpected weight change.   Respiratory:  Negative for cough and shortness of breath.    Cardiovascular:  Negative for chest pain and palpitations.   Gastrointestinal:  Negative for abdominal pain, constipation, diarrhea, nausea and vomiting.   Skin:  Negative for rash.   Neurological:  Negative for headaches.   Hematological:  Negative for adenopathy. Does not bruise/bleed easily.       ECOG Performance Status: 1   Objective:      Vitals:   Vitals:    11/17/23 1406   BP: 128/86   BP Location: Left arm   Patient Position: Sitting   BP Method: Medium (Manual)   Pulse: 74   Temp: 96.7 °F (35.9 °C)   TempSrc: Temporal   SpO2: 98%   Weight: 73.3 kg (161 lb 9.6  "oz)   Height: 5' 7" (1.702 m)       Physical Exam  Constitutional:       General: She is not in acute distress.     Appearance: She is well-developed. She is not diaphoretic.   HENT:      Head: Normocephalic and atraumatic.   Cardiovascular:      Rate and Rhythm: Normal rate and regular rhythm.      Heart sounds: Normal heart sounds. No murmur heard.     No friction rub. No gallop.   Pulmonary:      Effort: Pulmonary effort is normal. No respiratory distress.      Breath sounds: Normal breath sounds. No wheezing or rales.   Chest:      Chest wall: No tenderness.   Abdominal:      General: Bowel sounds are normal. There is no distension.      Palpations: Abdomen is soft. There is no mass.      Tenderness: There is no abdominal tenderness. There is no guarding or rebound.   Lymphadenopathy:      Cervical: No cervical adenopathy.      Upper Body:      Right upper body: No supraclavicular or axillary adenopathy.      Left upper body: No supraclavicular or axillary adenopathy.   Skin:     Findings: No erythema or rash.   Neurological:      Mental Status: She is alert and oriented to person, place, and time.   Psychiatric:         Behavior: Behavior normal.         Laboratory Data:  No visits with results within 1 Week(s) from this visit.   Latest known visit with results is:   Lab Visit on 11/08/2023   Component Date Value Ref Range Status    Specimen UA 11/08/2023 Urine, Clean Catch   Final    Color, UA 11/08/2023 Yellow  Yellow, Straw, Stefania Final    Appearance, UA 11/08/2023 Clear  Clear Final    pH, UA 11/08/2023 6.0  5.0 - 8.0 Final    Specific Gravity, UA 11/08/2023 1.025  1.005 - 1.030 Final    Protein, UA 11/08/2023 Negative  Negative Final    Comment: Recommend a 24 hour urine protein or a urine   protein/creatinine ratio if globulin induced proteinuria is  clinically suspected.      Glucose, UA 11/08/2023 Negative  Negative Final    Ketones, UA 11/08/2023 Negative  Negative Final    Bilirubin (UA) 11/08/2023 " Negative  Negative Final    Occult Blood UA 11/08/2023 Negative  Negative Final    Nitrite, UA 11/08/2023 Negative  Negative Final    Leukocytes, UA 11/08/2023 Negative  Negative Final    WBC 11/08/2023 11.52  3.90 - 12.70 K/uL Final    RBC 11/08/2023 4.90  4.00 - 5.40 M/uL Final    Hemoglobin 11/08/2023 14.6  12.0 - 16.0 g/dL Final    Hematocrit 11/08/2023 44.8  37.0 - 48.5 % Final    MCV 11/08/2023 91  82 - 98 fL Final    MCH 11/08/2023 29.8  27.0 - 31.0 pg Final    MCHC 11/08/2023 32.6  32.0 - 36.0 g/dL Final    RDW 11/08/2023 12.6  11.5 - 14.5 % Final    Platelets 11/08/2023 393  150 - 450 K/uL Final    MPV 11/08/2023 10.3  9.2 - 12.9 fL Final    Immature Granulocytes 11/08/2023 0.5  0.0 - 0.5 % Final    Gran # (ANC) 11/08/2023 6.6  1.8 - 7.7 K/uL Final    Immature Grans (Abs) 11/08/2023 0.06 (H)  0.00 - 0.04 K/uL Final    Comment: Mild elevation in immature granulocytes is non specific and   can be seen in a variety of conditions including stress response,   acute inflammation, trauma and pregnancy. Correlation with other   laboratory and clinical findings is essential.      Lymph # 11/08/2023 3.8  1.0 - 4.8 K/uL Final    Mono # 11/08/2023 0.7  0.3 - 1.0 K/uL Final    Eos # 11/08/2023 0.2  0.0 - 0.5 K/uL Final    Baso # 11/08/2023 0.14  0.00 - 0.20 K/uL Final    nRBC 11/08/2023 0  0 /100 WBC Final    Gran % 11/08/2023 57.2  38.0 - 73.0 % Final    Lymph % 11/08/2023 32.8  18.0 - 48.0 % Final    Mono % 11/08/2023 6.2  4.0 - 15.0 % Final    Eosinophil % 11/08/2023 2.1  0.0 - 8.0 % Final    Basophil % 11/08/2023 1.2  0.0 - 1.9 % Final    Differential Method 11/08/2023 Automated   Final    Sodium 11/08/2023 139  136 - 145 mmol/L Final    Potassium 11/08/2023 4.1  3.5 - 5.1 mmol/L Final    Chloride 11/08/2023 106  95 - 110 mmol/L Final    CO2 11/08/2023 23  23 - 29 mmol/L Final    Glucose 11/08/2023 94  70 - 110 mg/dL Final    BUN 11/08/2023 14  8 - 23 mg/dL Final    Creatinine 11/08/2023 1.0  0.5 - 1.4 mg/dL Final     Calcium 11/08/2023 9.7  8.7 - 10.5 mg/dL Final    Total Protein 11/08/2023 8.4  6.0 - 8.4 g/dL Final    Albumin 11/08/2023 4.1  3.5 - 5.2 g/dL Final    Total Bilirubin 11/08/2023 0.9  0.1 - 1.0 mg/dL Final    Comment: For infants and newborns, interpretation of results should be based  on gestational age, weight and in agreement with clinical  observations.    Premature Infant recommended reference ranges:  Up to 24 hours.............<8.0 mg/dL  Up to 48 hours............<12.0 mg/dL  3-5 days..................<15.0 mg/dL  6-29 days.................<15.0 mg/dL      Alkaline Phosphatase 11/08/2023 64  55 - 135 U/L Final    AST 11/08/2023 14  10 - 40 U/L Final    ALT 11/08/2023 15  10 - 44 U/L Final    eGFR 11/08/2023 >60.0  >60 mL/min/1.73 m^2 Final    Anion Gap 11/08/2023 10  8 - 16 mmol/L Final    LD 11/08/2023 146  110 - 260 U/L Final    Results are increased in hemolyzed samples.    Uric Acid 11/08/2023 3.5  2.4 - 5.7 mg/dL Final    CRP 11/08/2023 7.3  0.0 - 8.2 mg/L Final    Ferritin 11/08/2023 109  20.0 - 300.0 ng/mL Final    Iron 11/08/2023 115  30 - 160 ug/dL Final    Transferrin 11/08/2023 363  200 - 375 mg/dL Final    TIBC 11/08/2023 537 (H)  250 - 450 ug/dL Final    Saturated Iron 11/08/2023 21  20 - 50 % Final    CRP 11/08/2023 7.3  0.0 - 8.2 mg/L Final    CHIARA Screen 11/08/2023 Negative <1:80  Negative <1:80 Final    CHIARA test was performed by Immunofluorescence on HEP2 cells.       Pathologist Review 11/08/2023 Review completed   Final    Pathologist Review Peripheral Smear 11/08/2023 REVIEWED   Final    Comment:   Electronically reviewed and signed by:  Tino Ocasio M.D.  Signed on 11/09/23 at 09:21  Pathologist interpretation of peripheral blood smear:  -- White blood cells are adequate.  Neutrophils are morphologically   unremarkable. Rare immature granulocytes are seen. Blasts are not   identified.  -- Red blood cells and platelets are adequate and morphologically   unremarkable.           Imaging:      CT Abdomen and Pelvis 10/04/23    Base of Chest:     There is no dense lobar consolidation or pleural effusion.  Included cardiac chambers are within normal limits.     Liver, pancreas, spleen, gallbladder:     There is no acute abnormality of the liver, pancreas or spleen.  There are no inflammatory changes in the gallbladder fossa.  Cholecystectomy clips.     Kidneys:     There is no obstructing urinary tract calculus, hydronephrosis or hydroureter. The adrenal glands are not enlarged.     Bowel:     The lack of oral contrast limits evaluation of the bowel.  There is no acute abnormality of the stomach. There is no small bowel dilatation.  There is no acute abnormality of the colon.  A few scattered diverticular seen in the colon.  The rectum is within normal limits.  The appendix is not clearly visualized.     Other:     There is no free fluid, free air or pathologic lymph node enlargement. The IVC is within normal limits.  There is no aneurysmal dilatation of the abdominal aorta.     Pelvis:     The urinary bladder is within normal limits. There is no significant free fluid in the pelvis.  The uterus and ovaries are not clearly visualized.     Body wall:     There is no acute body wall finding.       Assessment:       1. Leukocytosis, unspecified type    2. Lichen sclerosus    3. Fatigue, unspecified type             Plan:     Leukocytosis - Pt with a persistent leukocytosis after starting on laser treatment for lichen slcerosis 9/08/23  -Labs on 11/08/2023 showed normal white blood cell count at 11.52 with mildly elevated immature granulocytes at 0.06 k/uL  -Pathologist review showed relatively unremarkable white blood cells with rare immature granulocytes.  No blasts were seen  -CHIARA was negative. LDH, uric acid, CRP, and ferritin were normal   -WBC elevation was likely a reaction to laser treatments for lichen sclerosis  -WIll monitor in 3 months given mildly elevated immature granulocytes on  11/08/23  -The patient was instructed to call the office if he develops fever, chills, night sweats, unexplained weight loss, new lumps or bumps, or easy bruising or bleeding    Lichen Slcerosus - pt under the care of gynecologist Dr Savannah Salazar with Gynecology  -Pt underwent laser treatment for lichen sclerosis 9/08/23 and 10/20/23 which may have prompted leukocytosis  -Management per gynecology    Fatigue - pt instructed to exercise 30 minutes a day 5 days a week    Route Chart for Scheduling    Med Onc Chart Routing      Follow up with physician 3 months. The patient needs a CBC, CMP, LDH, and uric acid in 3 months with a return appt.   Follow up with SINDI    Infusion scheduling note    Injection scheduling note    Labs    Imaging    Pharmacy appointment    Other referrals                   Leonides Duvall MD  [facility]  Hematology and Oncology  [facility]   [address] [place] [address]   O: [phone]  F: [phone]

## 2023-11-17 ENCOUNTER — OFFICE VISIT (OUTPATIENT)
Dept: HEMATOLOGY/ONCOLOGY | Facility: CLINIC | Age: 68
End: 2023-11-17
Payer: MEDICARE

## 2023-11-17 VITALS
WEIGHT: 161.63 LBS | HEART RATE: 74 BPM | SYSTOLIC BLOOD PRESSURE: 128 MMHG | TEMPERATURE: 97 F | BODY MASS INDEX: 25.37 KG/M2 | HEIGHT: 67 IN | DIASTOLIC BLOOD PRESSURE: 86 MMHG | OXYGEN SATURATION: 98 %

## 2023-11-17 DIAGNOSIS — R53.83 FATIGUE, UNSPECIFIED TYPE: ICD-10-CM

## 2023-11-17 DIAGNOSIS — D72.829 LEUKOCYTOSIS, UNSPECIFIED TYPE: Primary | ICD-10-CM

## 2023-11-17 DIAGNOSIS — L90.0 LICHEN SCLEROSUS: ICD-10-CM

## 2023-11-17 PROCEDURE — 99214 OFFICE O/P EST MOD 30 MIN: CPT | Mod: S$PBB,,, | Performed by: INTERNAL MEDICINE

## 2023-11-17 PROCEDURE — 99214 PR OFFICE/OUTPT VISIT, EST, LEVL IV, 30-39 MIN: ICD-10-PCS | Mod: S$PBB,,, | Performed by: INTERNAL MEDICINE

## 2023-11-17 PROCEDURE — 99999 PR PBB SHADOW E&M-EST. PATIENT-LVL IV: ICD-10-PCS | Mod: PBBFAC,,, | Performed by: INTERNAL MEDICINE

## 2023-11-17 PROCEDURE — 99214 OFFICE O/P EST MOD 30 MIN: CPT | Mod: PBBFAC,PN | Performed by: INTERNAL MEDICINE

## 2023-11-17 PROCEDURE — 99999 PR PBB SHADOW E&M-EST. PATIENT-LVL IV: CPT | Mod: PBBFAC,,, | Performed by: INTERNAL MEDICINE

## 2024-01-24 ENCOUNTER — PATIENT MESSAGE (OUTPATIENT)
Dept: PSYCHIATRY | Facility: CLINIC | Age: 69
End: 2024-01-24
Payer: MEDICARE

## 2024-02-02 ENCOUNTER — TELEPHONE (OUTPATIENT)
Dept: PSYCHIATRY | Facility: CLINIC | Age: 69
End: 2024-02-02
Payer: MEDICARE

## 2024-02-09 ENCOUNTER — PATIENT MESSAGE (OUTPATIENT)
Dept: HEMATOLOGY/ONCOLOGY | Facility: CLINIC | Age: 69
End: 2024-02-09
Payer: MEDICARE

## 2024-02-12 ENCOUNTER — LAB VISIT (OUTPATIENT)
Dept: LAB | Facility: HOSPITAL | Age: 69
End: 2024-02-12
Attending: INTERNAL MEDICINE
Payer: MEDICARE

## 2024-02-12 ENCOUNTER — PATIENT MESSAGE (OUTPATIENT)
Dept: PSYCHIATRY | Facility: CLINIC | Age: 69
End: 2024-02-12
Payer: MEDICARE

## 2024-02-12 DIAGNOSIS — R89.9 ABNORMAL LABORATORY TEST: ICD-10-CM

## 2024-02-12 DIAGNOSIS — D72.829 LEUKOCYTOSIS, UNSPECIFIED TYPE: ICD-10-CM

## 2024-02-12 LAB
ALBUMIN SERPL BCP-MCNC: 4 G/DL (ref 3.5–5.2)
ALP SERPL-CCNC: 60 U/L (ref 55–135)
ALT SERPL W/O P-5'-P-CCNC: 16 U/L (ref 10–44)
ANION GAP SERPL CALC-SCNC: 9 MMOL/L (ref 8–16)
AST SERPL-CCNC: 16 U/L (ref 10–40)
BASOPHILS # BLD AUTO: 0.1 K/UL (ref 0–0.2)
BASOPHILS # BLD AUTO: 0.1 K/UL (ref 0–0.2)
BASOPHILS NFR BLD: 0.7 % (ref 0–1.9)
BASOPHILS NFR BLD: 0.7 % (ref 0–1.9)
BILIRUB SERPL-MCNC: 1 MG/DL (ref 0.1–1)
BILIRUB UR QL STRIP: NEGATIVE
BUN SERPL-MCNC: 18 MG/DL (ref 8–23)
CALCIUM SERPL-MCNC: 9.6 MG/DL (ref 8.7–10.5)
CHLORIDE SERPL-SCNC: 108 MMOL/L (ref 95–110)
CLARITY UR: CLEAR
CO2 SERPL-SCNC: 23 MMOL/L (ref 23–29)
COLOR UR: YELLOW
CREAT SERPL-MCNC: 0.9 MG/DL (ref 0.5–1.4)
DIFFERENTIAL METHOD BLD: ABNORMAL
DIFFERENTIAL METHOD BLD: ABNORMAL
EOSINOPHIL # BLD AUTO: 0.2 K/UL (ref 0–0.5)
EOSINOPHIL # BLD AUTO: 0.2 K/UL (ref 0–0.5)
EOSINOPHIL NFR BLD: 1.3 % (ref 0–8)
EOSINOPHIL NFR BLD: 1.3 % (ref 0–8)
ERYTHROCYTE [DISTWIDTH] IN BLOOD BY AUTOMATED COUNT: 13.1 % (ref 11.5–14.5)
ERYTHROCYTE [DISTWIDTH] IN BLOOD BY AUTOMATED COUNT: 13.1 % (ref 11.5–14.5)
EST. GFR  (NO RACE VARIABLE): >60 ML/MIN/1.73 M^2
GLUCOSE SERPL-MCNC: 107 MG/DL (ref 70–110)
GLUCOSE UR QL STRIP: NEGATIVE
HCT VFR BLD AUTO: 40.1 % (ref 37–48.5)
HCT VFR BLD AUTO: 40.1 % (ref 37–48.5)
HGB BLD-MCNC: 13.9 G/DL (ref 12–16)
HGB BLD-MCNC: 13.9 G/DL (ref 12–16)
HGB UR QL STRIP: NEGATIVE
IMM GRANULOCYTES # BLD AUTO: 0.06 K/UL (ref 0–0.04)
IMM GRANULOCYTES # BLD AUTO: 0.06 K/UL (ref 0–0.04)
IMM GRANULOCYTES NFR BLD AUTO: 0.4 % (ref 0–0.5)
IMM GRANULOCYTES NFR BLD AUTO: 0.4 % (ref 0–0.5)
KETONES UR QL STRIP: ABNORMAL
LDH SERPL L TO P-CCNC: 143 U/L (ref 110–260)
LEUKOCYTE ESTERASE UR QL STRIP: NEGATIVE
LYMPHOCYTES # BLD AUTO: 3.1 K/UL (ref 1–4.8)
LYMPHOCYTES # BLD AUTO: 3.1 K/UL (ref 1–4.8)
LYMPHOCYTES NFR BLD: 21.7 % (ref 18–48)
LYMPHOCYTES NFR BLD: 21.7 % (ref 18–48)
MCH RBC QN AUTO: 29.6 PG (ref 27–31)
MCH RBC QN AUTO: 29.6 PG (ref 27–31)
MCHC RBC AUTO-ENTMCNC: 34.7 G/DL (ref 32–36)
MCHC RBC AUTO-ENTMCNC: 34.7 G/DL (ref 32–36)
MCV RBC AUTO: 86 FL (ref 82–98)
MCV RBC AUTO: 86 FL (ref 82–98)
MONOCYTES # BLD AUTO: 0.7 K/UL (ref 0.3–1)
MONOCYTES # BLD AUTO: 0.7 K/UL (ref 0.3–1)
MONOCYTES NFR BLD: 5.2 % (ref 4–15)
MONOCYTES NFR BLD: 5.2 % (ref 4–15)
NEUTROPHILS # BLD AUTO: 10 K/UL (ref 1.8–7.7)
NEUTROPHILS # BLD AUTO: 10 K/UL (ref 1.8–7.7)
NEUTROPHILS NFR BLD: 70.7 % (ref 38–73)
NEUTROPHILS NFR BLD: 70.7 % (ref 38–73)
NITRITE UR QL STRIP: NEGATIVE
NRBC BLD-RTO: 0 /100 WBC
NRBC BLD-RTO: 0 /100 WBC
PH UR STRIP: 5 [PH] (ref 5–8)
PLATELET # BLD AUTO: 408 K/UL (ref 150–450)
PLATELET # BLD AUTO: 408 K/UL (ref 150–450)
PMV BLD AUTO: 10.2 FL (ref 9.2–12.9)
PMV BLD AUTO: 10.2 FL (ref 9.2–12.9)
POTASSIUM SERPL-SCNC: 4.6 MMOL/L (ref 3.5–5.1)
PROT SERPL-MCNC: 7.6 G/DL (ref 6–8.4)
PROT UR QL STRIP: ABNORMAL
RBC # BLD AUTO: 4.69 M/UL (ref 4–5.4)
RBC # BLD AUTO: 4.69 M/UL (ref 4–5.4)
SODIUM SERPL-SCNC: 140 MMOL/L (ref 136–145)
SP GR UR STRIP: >=1.03 (ref 1–1.03)
URATE SERPL-MCNC: 4.7 MG/DL (ref 2.4–5.7)
URN SPEC COLLECT METH UR: ABNORMAL
WBC # BLD AUTO: 14.15 K/UL (ref 3.9–12.7)
WBC # BLD AUTO: 14.15 K/UL (ref 3.9–12.7)

## 2024-02-12 PROCEDURE — 84550 ASSAY OF BLOOD/URIC ACID: CPT | Mod: PN | Performed by: INTERNAL MEDICINE

## 2024-02-12 PROCEDURE — 83615 LACTATE (LD) (LDH) ENZYME: CPT | Mod: PN | Performed by: INTERNAL MEDICINE

## 2024-02-12 PROCEDURE — 81003 URINALYSIS AUTO W/O SCOPE: CPT | Mod: PN

## 2024-02-12 PROCEDURE — 36415 COLL VENOUS BLD VENIPUNCTURE: CPT | Mod: PN | Performed by: INTERNAL MEDICINE

## 2024-02-12 PROCEDURE — 80053 COMPREHEN METABOLIC PANEL: CPT | Mod: PN | Performed by: INTERNAL MEDICINE

## 2024-02-12 PROCEDURE — 85025 COMPLETE CBC W/AUTO DIFF WBC: CPT | Mod: PN

## 2024-02-14 ENCOUNTER — OFFICE VISIT (OUTPATIENT)
Dept: HEMATOLOGY/ONCOLOGY | Facility: CLINIC | Age: 69
End: 2024-02-14
Payer: MEDICARE

## 2024-02-14 DIAGNOSIS — F32.A ANXIETY AND DEPRESSION: ICD-10-CM

## 2024-02-14 DIAGNOSIS — F41.9 ANXIETY AND DEPRESSION: ICD-10-CM

## 2024-02-14 DIAGNOSIS — D72.829 LEUKOCYTOSIS, UNSPECIFIED TYPE: Primary | ICD-10-CM

## 2024-02-14 PROCEDURE — 99214 OFFICE O/P EST MOD 30 MIN: CPT | Mod: S$PBB,,,

## 2024-02-14 PROCEDURE — 99999 PR PBB SHADOW E&M-EST. PATIENT-LVL III: CPT | Mod: PBBFAC,,,

## 2024-02-14 PROCEDURE — 99213 OFFICE O/P EST LOW 20 MIN: CPT | Mod: PBBFAC,PN

## 2024-02-14 NOTE — PROGRESS NOTES
PATIENT: Arleen Oconnell  MRN: [id number]  DATE: 2/14/2024      Diagnosis:   1. Leukocytosis, unspecified type    2. Lichen sclerosus    3. Fatigue, unspecified type            Chief Complaint: Leukocytosis, unspecified type     Subjective:    Interval History: Ms. Oconnell is a 68 y.o. female with Anxiety, asthma, depression, fibromyalgia, GERD, insomnia, migraines, vertigo, lichen sclerosus s/p laser treatment who presents for leukocytosis.  Since the last clinic visit the patient underwent labs on 11/08/2023 showed normal white blood cell count at 11.52 with mildly elevated immature granulocytes at 0.06 k/uL.  Pathologist review showed relatively unremarkable white blood cells with rare immature granulocytes.  No blasts were seen.  CHIARA was negative. LDH, uric acid, CRP, and ferritin were normal.    Pt endorses persistent fatigue.The patient endorses increased anxiety that she believes is due to hormonal issues ans LS flares. She is currently prescribed klonopin, Wellbutrin, and Buspar. She was referred to psychology per PCP, but has not had an appt yet. The patient denies CP, cough, SOB, abdominal pain, nausea, vomiting, constipation, diarrhea.  The patient denies fever, chills, night sweats, weight loss, new lumps or bumps, easy bruising or bleeding.     Prior History:  The patient underwent biopsy of the labia in the past showing lichen sclerosis.  Pt is under the care of Dr Savannah Salazar with Gynecology.  Pt states she underwent laser treatment for lichen sclerosis 9/08/23 and 10/20/23.  The patient's WBC has been elevated since at least 10/04/23 with neutrophilia.    Past Medical History:   Past Medical History:   Diagnosis Date    Anxiety     Asthma     seasonal    Constipation     Depression     Environmental allergies     Fibromyalgia     GERD (gastroesophageal reflux disease)     History of bronchitis     History of pneumonia     Inner ear anomaly     Insomnia     Migraines     PONV (postoperative nausea  and vomiting)     Vertigo        Past Surgical HIstory:   Past Surgical History:   Procedure Laterality Date    BUNIONECTOMY Bilateral     CHOLECYSTECTOMY      CORRECTION OF HAMMER TOE Left 11/30/2020    Procedure: CORRECTION, HAMMER TOE;  Surgeon: Brian Cain MD;  Location: [facility];  Service: Orthopedics;  Laterality: Left;    HAND SURGERY Bilateral     basal joint repair    HYSTERECTOMY      JOINT REPLACEMENT Left 04/01/2014    Left TKA    KNEE ARTHROSCOPY Left 12/17/2013    Left knee scope    LAPAROSCOPIC CHOLECYSTECTOMY N/A 1/15/2020    Procedure: CHOLECYSTECTOMY, LAPAROSCOPIC;  Surgeon: Ian Lomax MD;  Location: [facility];  Service: General;  Laterality: N/A;    OPEN REDUCTION AND INTERNAL FIXATION (ORIF) OF FRACTURE OF METATARSAL BONE Left 11/30/2020    Procedure: ORIF, FRACTURE, METATARSAL BONE;  Surgeon: Brian Cain MD;  Location: [facility];  Service: Orthopedics;  Laterality: Left;  07614 is appropriate CPT       Family History:   Family History   Problem Relation Age of Onset    Stroke Mother     Rheum arthritis Father     Breast cancer Sister     Breast cancer Sister        Social History:  reports that she quit smoking about 28 years ago. Her smoking use included cigarettes. She has never used smokeless tobacco. She reports that she does not currently use alcohol after a past usage of about 3.0 - 4.0 standard drinks of alcohol per week. She reports that she does not use drugs.    Allergies:  Review of patient's allergies indicates:   Allergen Reactions    Neomycin-polymyxin-gramicidin     Zofran [ondansetron hcl (pf)]      Severe headache    Morphine Rash    Neosporin [benzalkonium chloride] Other (See Comments)     Blistering to area applied       Medications:  Current Outpatient Medications   Medication Sig Dispense Refill    albuterol (PROVENTIL/VENTOLIN HFA) 90 mcg/actuation inhaler Inhale 2 puffs into the lungs 4 (four) times daily.      ascorbic acid, vitamin C, (VITAMIN C) 1000 MG  tablet Take 1,000 mg by mouth once daily.      benzonatate (TESSALON PERLES) 100 MG capsule Take 1 capsule (100 mg total) by mouth 3 (three) times daily as needed for Cough. 30 capsule 1    biotin 5,000 mcg Subl Place 5,000 mcg under the tongue.      buPROPion (WELLBUTRIN XL) 300 MG 24 hr tablet TAKE 1 TABLET BY MOUTH EVERY DAY. 30 tablet 4    busPIRone (BUSPAR) 7.5 MG tablet Take 1 tablet (7.5 mg total) by mouth 2 (two) times a day. 60 tablet 11    CALCIUM ORAL Take 1 tablet by mouth once daily.       cetirizine (ZYRTEC) 10 MG tablet Take 10 mg by mouth once daily.      cholecalciferol, vitamin D3, 125 mcg (5,000 unit) Tab Take 1 tablet by mouth once daily.      clonazePAM (KLONOPIN) 1 MG tablet TAKE 1 TABLET BY MOUTH TWO TIMES A DAY. 60 tablet 3    ergocalciferol, vitamin D2, (VITAMIN D ORAL) Take 10,000 mg by mouth once daily.      gabapentin (NEURONTIN) 300 MG capsule TAKE 1 CAPSULE (300 MG TOTAL) BY MOUTH ONCE DAILY 30 capsule 11    OMEPRAZOLE (PRILOSEC ORAL) Take 20 mg by mouth once daily.       ondansetron (ZOFRAN-ODT) 4 MG TbDL Take 1 tablet (4 mg total) by mouth every 8 (eight) hours as needed. 30 tablet 6    progesterone (PROMETRIUM) 200 MG capsule Take 200 mg by mouth every evening.       sodium chloride (OCEAN NASAL) 0.65 % nasal spray 1 spray by Nasal route as needed for Congestion. 45 mL 3    zinc gluconate 50 mg tablet Take 50 mg by mouth once daily.       No current facility-administered medications for this visit.       Review of Systems   Constitutional:  Positive for fatigue. Negative for chills, fever and unexpected weight change.   HENT:  Negative for mouth sores.    Eyes:  Negative for visual disturbance.   Respiratory:  Negative for cough and shortness of breath.    Cardiovascular:  Negative for chest pain and palpitations.   Gastrointestinal:  Negative for abdominal pain, constipation, diarrhea, nausea and vomiting.   Skin:  Negative for rash.   Neurological:  Negative for headaches.    Hematological:  Negative for adenopathy. Does not bruise/bleed easily.   Psychiatric/Behavioral:  The patient is nervous/anxious.        ECOG Performance Status: 0   Objective:      Vitals:     Physical Exam  Constitutional:       General: She is not in acute distress.     Appearance: She is well-developed. She is not diaphoretic.   HENT:      Head: Normocephalic and atraumatic.   Cardiovascular:      Rate and Rhythm: Normal rate and regular rhythm.      Heart sounds: Normal heart sounds. No murmur heard.     No friction rub. No gallop.   Pulmonary:      Effort: Pulmonary effort is normal. No respiratory distress.      Breath sounds: Normal breath sounds. No wheezing or rales.   Chest:      Chest wall: No tenderness.   Abdominal:      General: Bowel sounds are normal. There is no distension.      Palpations: Abdomen is soft. There is no mass.      Tenderness: There is no abdominal tenderness. There is no guarding or rebound.   Lymphadenopathy:      Cervical: No cervical adenopathy.      Upper Body:      Right upper body: No supraclavicular or axillary adenopathy.      Left upper body: No supraclavicular or axillary adenopathy.   Skin:     Findings: No erythema or rash.   Neurological:      Mental Status: She is alert and oriented to person, place, and time.   Psychiatric:         Behavior: Behavior normal.         Laboratory Data:  Lab Visit on 02/12/2024   Component Date Value Ref Range Status    WBC 02/12/2024 14.15 (H)  3.90 - 12.70 K/uL Final    RBC 02/12/2024 4.69  4.00 - 5.40 M/uL Final    Hemoglobin 02/12/2024 13.9  12.0 - 16.0 g/dL Final    Hematocrit 02/12/2024 40.1  37.0 - 48.5 % Final    MCV 02/12/2024 86  82 - 98 fL Final    MCH 02/12/2024 29.6  27.0 - 31.0 pg Final    MCHC 02/12/2024 34.7  32.0 - 36.0 g/dL Final    RDW 02/12/2024 13.1  11.5 - 14.5 % Final    Platelets 02/12/2024 408  150 - 450 K/uL Final    MPV 02/12/2024 10.2  9.2 - 12.9 fL Final    Immature Granulocytes 02/12/2024 0.4  0.0 - 0.5 %  Final    Gran # (ANC) 02/12/2024 10.0 (H)  1.8 - 7.7 K/uL Final    Immature Grans (Abs) 02/12/2024 0.06 (H)  0.00 - 0.04 K/uL Final    Comment: Mild elevation in immature granulocytes is non specific and   can be seen in a variety of conditions including stress response,   acute inflammation, trauma and pregnancy. Correlation with other   laboratory and clinical findings is essential.      Lymph # 02/12/2024 3.1  1.0 - 4.8 K/uL Final    Mono # 02/12/2024 0.7  0.3 - 1.0 K/uL Final    Eos # 02/12/2024 0.2  0.0 - 0.5 K/uL Final    Baso # 02/12/2024 0.10  0.00 - 0.20 K/uL Final    nRBC 02/12/2024 0  0 /100 WBC Final    Gran % 02/12/2024 70.7  38.0 - 73.0 % Final    Lymph % 02/12/2024 21.7  18.0 - 48.0 % Final    Mono % 02/12/2024 5.2  4.0 - 15.0 % Final    Eosinophil % 02/12/2024 1.3  0.0 - 8.0 % Final    Basophil % 02/12/2024 0.7  0.0 - 1.9 % Final    Differential Method 02/12/2024 Automated   Final    Specimen UA 02/12/2024 Urine, Clean Catch   Final    Color, UA 02/12/2024 Yellow  Yellow, Straw, Stefania Final    Appearance, UA 02/12/2024 Clear  Clear Final    pH, UA 02/12/2024 5.0  5.0 - 8.0 Final    Specific Gravity, UA 02/12/2024 >=1.030 (A)  1.005 - 1.030 Final    Protein, UA 02/12/2024 Trace (A)  Negative Final    Comment: Recommend a 24 hour urine protein or a urine   protein/creatinine ratio if globulin induced proteinuria is  clinically suspected.      Glucose, UA 02/12/2024 Negative  Negative Final    Ketones, UA 02/12/2024 1+ (A)  Negative Final    Bilirubin (UA) 02/12/2024 Negative  Negative Final    Occult Blood UA 02/12/2024 Negative  Negative Final    Nitrite, UA 02/12/2024 Negative  Negative Final    Leukocytes, UA 02/12/2024 Negative  Negative Final    WBC 02/12/2024 14.15 (H)  3.90 - 12.70 K/uL Final    RBC 02/12/2024 4.69  4.00 - 5.40 M/uL Final    Hemoglobin 02/12/2024 13.9  12.0 - 16.0 g/dL Final    Hematocrit 02/12/2024 40.1  37.0 - 48.5 % Final    MCV 02/12/2024 86  82 - 98 fL Final    MCH  02/12/2024 29.6  27.0 - 31.0 pg Final    MCHC 02/12/2024 34.7  32.0 - 36.0 g/dL Final    RDW 02/12/2024 13.1  11.5 - 14.5 % Final    Platelets 02/12/2024 408  150 - 450 K/uL Final    MPV 02/12/2024 10.2  9.2 - 12.9 fL Final    Immature Granulocytes 02/12/2024 0.4  0.0 - 0.5 % Final    Gran # (ANC) 02/12/2024 10.0 (H)  1.8 - 7.7 K/uL Final    Immature Grans (Abs) 02/12/2024 0.06 (H)  0.00 - 0.04 K/uL Final    Comment: Mild elevation in immature granulocytes is non specific and   can be seen in a variety of conditions including stress response,   acute inflammation, trauma and pregnancy. Correlation with other   laboratory and clinical findings is essential.      Lymph # 02/12/2024 3.1  1.0 - 4.8 K/uL Final    Mono # 02/12/2024 0.7  0.3 - 1.0 K/uL Final    Eos # 02/12/2024 0.2  0.0 - 0.5 K/uL Final    Baso # 02/12/2024 0.10  0.00 - 0.20 K/uL Final    nRBC 02/12/2024 0  0 /100 WBC Final    Gran % 02/12/2024 70.7  38.0 - 73.0 % Final    Lymph % 02/12/2024 21.7  18.0 - 48.0 % Final    Mono % 02/12/2024 5.2  4.0 - 15.0 % Final    Eosinophil % 02/12/2024 1.3  0.0 - 8.0 % Final    Basophil % 02/12/2024 0.7  0.0 - 1.9 % Final    Differential Method 02/12/2024 Automated   Final    Sodium 02/12/2024 140  136 - 145 mmol/L Final    Potassium 02/12/2024 4.6  3.5 - 5.1 mmol/L Final    Chloride 02/12/2024 108  95 - 110 mmol/L Final    CO2 02/12/2024 23  23 - 29 mmol/L Final    Glucose 02/12/2024 107  70 - 110 mg/dL Final    BUN 02/12/2024 18  8 - 23 mg/dL Final    Creatinine 02/12/2024 0.9  0.5 - 1.4 mg/dL Final    Calcium 02/12/2024 9.6  8.7 - 10.5 mg/dL Final    Total Protein 02/12/2024 7.6  6.0 - 8.4 g/dL Final    Albumin 02/12/2024 4.0  3.5 - 5.2 g/dL Final    Total Bilirubin 02/12/2024 1.0  0.1 - 1.0 mg/dL Final    Comment: For infants and newborns, interpretation of results should be based  on gestational age, weight and in agreement with clinical  observations.    Premature Infant recommended reference ranges:  Up to 24  hours.............<8.0 mg/dL  Up to 48 hours............<12.0 mg/dL  3-5 days..................<15.0 mg/dL  6-29 days.................<15.0 mg/dL      Alkaline Phosphatase 02/12/2024 60  55 - 135 U/L Final    AST 02/12/2024 16  10 - 40 U/L Final    ALT 02/12/2024 16  10 - 44 U/L Final    eGFR 02/12/2024 >60.0  >60 mL/min/1.73 m^2 Final    Anion Gap 02/12/2024 9  8 - 16 mmol/L Final    LD 02/12/2024 143  110 - 260 U/L Final    Results are increased in hemolyzed samples.    Uric Acid 02/12/2024 4.7  2.4 - 5.7 mg/dL Final         Imaging:     CT Abdomen and Pelvis 10/04/23    Base of Chest:     There is no dense lobar consolidation or pleural effusion.  Included cardiac chambers are within normal limits.     Liver, pancreas, spleen, gallbladder:     There is no acute abnormality of the liver, pancreas or spleen.  There are no inflammatory changes in the gallbladder fossa.  Cholecystectomy clips.     Kidneys:     There is no obstructing urinary tract calculus, hydronephrosis or hydroureter. The adrenal glands are not enlarged.     Bowel:     The lack of oral contrast limits evaluation of the bowel.  There is no acute abnormality of the stomach. There is no small bowel dilatation.  There is no acute abnormality of the colon.  A few scattered diverticular seen in the colon.  The rectum is within normal limits.  The appendix is not clearly visualized.     Other:     There is no free fluid, free air or pathologic lymph node enlargement. The IVC is within normal limits.  There is no aneurysmal dilatation of the abdominal aorta.     Pelvis:     The urinary bladder is within normal limits. There is no significant free fluid in the pelvis.  The uterus and ovaries are not clearly visualized.     Body wall:     There is no acute body wall finding.       Assessment:       1.Leukocytosis  2. Linchen Schlerosus  3.Anxiety           Plan:     Leukocytosis - Pt with a persistent leukocytosis after starting on laser treatment for  lichen slcerosis 9/08/23  -Labs on 11/08/2023 showed normal white blood cell count at 11.52 with mildly elevated immature granulocytes at 0.06 k/uL  -Pathologist review showed relatively unremarkable white blood cells with rare immature granulocytes.  No blasts were seen  -CHIARA was negative. LDH, uric acid, CRP, and ferritin were normal   -WBC elevation was likely a reaction to laser treatments for lichen sclerosis  -WIll monitor in 3 months given mildly elevated immature granulocytes on 11/08/23  -immature granulocytes consistent 0.06 k/ul, WBC 14.15 trending down.  -LS flares, URI infections recently  -Will monitor in 6 months  -The patient was instructed to call the office if she develops fever, chills, night sweats, unexplained weight loss, new lumps or bumps, or easy bruising or bleeding. If any worsening of leucocytes or symptoms.      Lichen Slcerosus    -pt under the care of gynecologist Dr Savannah Salazar with Gynecology  -Pt underwent laser treatment for lichen sclerosis 9/08/23 and 10/20/23 which may have prompted leukocytosis  -Management per gynecology    Anxiety  -pt under care of PCP taking klonopin, buspar, and wellbutrin  -PCP referred to Psych    Route Chart for Scheduling    Med Onc Chart Routing      Follow up with physician . F/u in 6 months, DR. Duvall CBC, CMP,   Follow up with SINDI    Infusion scheduling note    Injection scheduling note    Labs    Imaging    Pharmacy appointment    Other referrals                  Katy Guzman NP  [facility]  Hematology and Oncology  [facility]   [address] [place] [address]   O: [phone]  F: [phone]

## 2024-02-14 NOTE — PROGRESS NOTES
Results reviewed with patient.     CBC: WBC mildly elevated, starting a lichen sclerosis flair. F/u with hem/onc.     UA: Trace protein, trace ketones. Will repeat UA in 2 weeks.

## 2024-03-03 DIAGNOSIS — R20.2 PARESTHESIA OF LEFT FOOT: ICD-10-CM

## 2024-03-04 RX ORDER — GABAPENTIN 300 MG/1
300 CAPSULE ORAL DAILY
Qty: 30 CAPSULE | Refills: 11 | Status: SHIPPED | OUTPATIENT
Start: 2024-03-04 | End: 2025-03-04

## 2024-08-13 ENCOUNTER — TELEPHONE (OUTPATIENT)
Dept: HEMATOLOGY/ONCOLOGY | Facility: CLINIC | Age: 69
End: 2024-08-13
Payer: MEDICARE

## 2024-08-13 NOTE — TELEPHONE ENCOUNTER
Called patient in regards to confirming her upcoming appt tomorrow. Patient voiced understanding and confirmed she would be present.

## 2024-08-13 NOTE — PROGRESS NOTES
PATIENT: Arleen Oconnell  MRN: [id number]  DATE: 8/14/2024      Diagnosis:   1. Leukocytosis, unspecified type    2. Lichen sclerosus    3. Meniere's disease, unspecified laterality    4. Weight loss            Chief Complaint: Leukocytosis, unspecified type (6 month follow up)      Oncologic History:      Oncologic History     Oncologic Treatment     Pathology           Subjective:    Interval History: Ms. Oconnell is a 69 y.o. female with Anxiety, asthma, depression, fibromyalgia, GERD, insomnia, migraines, vertigo, lichen sclerosus s/p laser treatment who presents for leukocytosis.  Since the last clinic visit the patient states she has developed recurrence of Meniere's disease with hearing loss, balance difficulty and sensation of fullness in her head.  She endorses associated nausea and weight loss.   She also endorses recurrent lesions from lichen sclerosis.  The patient denies CP, cough, SOB, abdominal pain, vomiting, constipation, diarrhea.  The patient denies fever, chills, night sweats, new lumps or bumps, easy bruising or bleeding.    Prior History:  The patient underwent biopsy of the labia in the past showing lichen sclerosis.  Pt is under the care of Dr Savannah Salazar with Gynecology.  Pt states she underwent laser treatment for lichen sclerosis 9/08/23 and 10/20/23.  The patient's WBC has been elevated since at least 10/04/23 with neutrophilia.   The patient underwent labs on 11/08/2023 showed normal white blood cell count at 11.52 with mildly elevated immature granulocytes at 0.06 k/uL.  Pathologist review showed relatively unremarkable white blood cells with rare immature granulocytes.  No blasts were seen.  CHIARA was negative. LDH, uric acid, CRP, and ferritin were normal.    Past Medical History:   Past Medical History:   Diagnosis Date    Anxiety     Asthma     seasonal    Constipation     Depression     Environmental allergies     Fibromyalgia     GERD (gastroesophageal reflux disease)     History  of bronchitis     History of pneumonia     Inner ear anomaly     Insomnia     Migraines     PONV (postoperative nausea and vomiting)     Vertigo        Past Surgical HIstory:   Past Surgical History:   Procedure Laterality Date    BUNIONECTOMY Bilateral     CHOLECYSTECTOMY      CORRECTION OF HAMMER TOE Left 11/30/2020    Procedure: CORRECTION, HAMMER TOE;  Surgeon: Brian Cain MD;  Location: [facility];  Service: Orthopedics;  Laterality: Left;    HAND SURGERY Bilateral     basal joint repair    HYSTERECTOMY      JOINT REPLACEMENT Left 04/01/2014    Left TKA    KNEE ARTHROSCOPY Left 12/17/2013    Left knee scope    LAPAROSCOPIC CHOLECYSTECTOMY N/A 1/15/2020    Procedure: CHOLECYSTECTOMY, LAPAROSCOPIC;  Surgeon: Ian Lomax MD;  Location: [facility];  Service: General;  Laterality: N/A;    OPEN REDUCTION AND INTERNAL FIXATION (ORIF) OF FRACTURE OF METATARSAL BONE Left 11/30/2020    Procedure: ORIF, FRACTURE, METATARSAL BONE;  Surgeon: Brian Cain MD;  Location: [facility];  Service: Orthopedics;  Laterality: Left;  [address]219 is appropriate CPT       Family History:   Family History   Problem Relation Name Age of Onset    Stroke Mother      Rheum arthritis Father      Breast cancer Sister      Breast cancer Sister         Social History:  reports that she quit smoking about 28 years ago. Her smoking use included cigarettes. She has never used smokeless tobacco. She reports that she does not currently use alcohol after a past usage of about 3.0 - 4.0 standard drinks of alcohol per week. She reports that she does not use drugs.    Allergies:  Review of patient's allergies indicates:   Allergen Reactions    Neomycin-polymyxin-gramicidin     Buspar [buspirone] Other (See Comments)     Drowsy, dizzy and syncope    Morphine Rash    Neosporin [benzalkonium chloride] Other (See Comments)     Blistering to area applied       Medications:  Current Outpatient Medications   Medication Sig Dispense Refill    albuterol  (PROVENTIL/VENTOLIN HFA) 90 mcg/actuation inhaler Inhale 2 puffs into the lungs 4 (four) times daily.      ascorbic acid, vitamin C, (VITAMIN C) 1000 MG tablet Take 1,000 mg by mouth once daily.      biotin 5,000 mcg Subl Place 5,000 mcg under the tongue.      buPROPion (WELLBUTRIN XL) 300 MG 24 hr tablet TAKE 1 TABLET BY MOUTH EVERY DAY. 30 tablet 4    CALCIUM ORAL Take 1 tablet by mouth once daily.       cetirizine (ZYRTEC) 10 MG tablet Take 10 mg by mouth once daily.      cholecalciferol, vitamin D3, 125 mcg (5,000 unit) Tab Take 1 tablet by mouth once daily.      clonazePAM (KLONOPIN) 1 MG tablet TAKE 1 TABLET BY MOUTH TWO TIMES A DAY 60 tablet 3    gabapentin (NEURONTIN) 300 MG capsule TAKE 1 CAPSULE (300 MG TOTAL) BY MOUTH ONCE DAILY 30 capsule 11    OMEPRAZOLE (PRILOSEC ORAL) Take 20 mg by mouth once daily.       ondansetron (ZOFRAN-ODT) 4 MG TbDL Take 1 tablet (4 mg total) by mouth every 8 (eight) hours as needed. 30 tablet 6    progesterone (PROMETRIUM) 200 MG capsule Take 200 mg by mouth every evening.       zinc gluconate 50 mg tablet Take 50 mg by mouth once daily.      ergocalciferol, vitamin D2, (VITAMIN D ORAL) Take 10,000 mg by mouth once daily. (Patient not taking: Reported on 7/24/2024)       No current facility-administered medications for this visit.       Review of Systems   Constitutional:  Positive for unexpected weight change. Negative for chills, fatigue and fever.   HENT:  Positive for hearing loss.         Sensation of fullness in the head   Respiratory:  Negative for cough and shortness of breath.    Cardiovascular:  Negative for chest pain and palpitations.   Gastrointestinal:  Positive for nausea. Negative for abdominal pain, constipation, diarrhea and vomiting.   Skin:  Negative for rash.   Neurological:  Positive for light-headedness. Negative for headaches.   Hematological:  Negative for adenopathy. Does not bruise/bleed easily.       ECOG Performance Status: 1   Objective:     "  Vitals:   Vitals:    08/14/24 1300   BP: 130/88   BP Location: Right arm   Patient Position: Sitting   BP Method: Medium (Manual)   Resp: 12   Temp: 97.1 °F (36.2 °C)   TempSrc: Temporal   Weight: 65.9 kg (145 lb 4.5 oz)   Height: 5' 7" (1.702 m)         Physical Exam  Constitutional:       General: She is not in acute distress.     Appearance: She is well-developed. She is not diaphoretic.   HENT:      Head: Normocephalic and atraumatic.   Cardiovascular:      Rate and Rhythm: Normal rate and regular rhythm.      Heart sounds: Normal heart sounds. No murmur heard.     No friction rub. No gallop.   Pulmonary:      Effort: Pulmonary effort is normal. No respiratory distress.      Breath sounds: Normal breath sounds. No wheezing or rales.   Chest:      Chest wall: No tenderness.   Abdominal:      General: Bowel sounds are normal. There is no distension.      Palpations: Abdomen is soft. There is no mass.      Tenderness: There is no abdominal tenderness. There is no guarding or rebound.   Lymphadenopathy:      Cervical: No cervical adenopathy.      Upper Body:      Right upper body: No supraclavicular or axillary adenopathy.      Left upper body: No supraclavicular or axillary adenopathy.   Skin:     Findings: No erythema or rash.   Neurological:      Mental Status: She is alert and oriented to person, place, and time.   Psychiatric:         Behavior: Behavior normal.         Laboratory Data:  Lab Visit on 08/14/2024   Component Date Value Ref Range Status    WBC 08/14/2024 13.06 (H)  3.90 - 12.70 K/uL Final    RBC 08/14/2024 4.49  4.00 - 5.40 M/uL Final    Hemoglobin 08/14/2024 13.3  12.0 - 16.0 g/dL Final    Hematocrit 08/14/2024 38.6  37.0 - 48.5 % Final    MCV 08/14/2024 86  82 - 98 fL Final    MCH 08/14/2024 29.6  27.0 - 31.0 pg Final    MCHC 08/14/2024 34.5  32.0 - 36.0 g/dL Final    RDW 08/14/2024 12.8  11.5 - 14.5 % Final    Platelets 08/14/2024 356  150 - 450 K/uL Final    MPV 08/14/2024 10.5  9.2 - 12.9 fL " Final    Immature Granulocytes 08/14/2024 0.4  0.0 - 0.5 % Final    Gran # (ANC) 08/14/2024 8.2 (H)  1.8 - 7.7 K/uL Final    Immature Grans (Abs) 08/14/2024 0.05 (H)  0.00 - 0.04 K/uL Final    Comment: Mild elevation in immature granulocytes is non specific and   can be seen in a variety of conditions including stress response,   acute inflammation, trauma and pregnancy. Correlation with other   laboratory and clinical findings is essential.      Lymph # 08/14/2024 3.5  1.0 - 4.8 K/uL Final    Mono # 08/14/2024 0.9  0.3 - 1.0 K/uL Final    Eos # 08/14/2024 0.2  0.0 - 0.5 K/uL Final    Baso # 08/14/2024 0.11  0.00 - 0.20 K/uL Final    nRBC 08/14/2024 0  0 /100 WBC Final    Gran % 08/14/2024 63.1  38.0 - 73.0 % Final    Lymph % 08/14/2024 27.0  18.0 - 48.0 % Final    Mono % 08/14/2024 6.9  4.0 - 15.0 % Final    Eosinophil % 08/14/2024 1.8  0.0 - 8.0 % Final    Basophil % 08/14/2024 0.8  0.0 - 1.9 % Final    Differential Method 08/14/2024 Automated   Final    Sodium 08/14/2024 139  136 - 145 mmol/L Final    Potassium 08/14/2024 3.5  3.5 - 5.1 mmol/L Final    Chloride 08/14/2024 106  95 - 110 mmol/L Final    CO2 08/14/2024 21 (L)  23 - 29 mmol/L Final    Glucose 08/14/2024 90  70 - 110 mg/dL Final    BUN 08/14/2024 23  8 - 23 mg/dL Final    Creatinine 08/14/2024 1.2  0.5 - 1.4 mg/dL Final    Calcium 08/14/2024 9.9  8.7 - 10.5 mg/dL Final    Total Protein 08/14/2024 7.6  6.0 - 8.4 g/dL Final    Albumin 08/14/2024 3.9  3.5 - 5.2 g/dL Final    Total Bilirubin 08/14/2024 0.7  0.1 - 1.0 mg/dL Final    Comment: For infants and newborns, interpretation of results should be based  on gestational age, weight and in agreement with clinical  observations.    Premature Infant recommended reference ranges:  Up to 24 hours.............<8.0 mg/dL  Up to 48 hours............<12.0 mg/dL  3-5 days..................<15.0 mg/dL  6-29 days.................<15.0 mg/dL      Alkaline Phosphatase 08/14/2024 73  55 - 135 U/L Final    AST  08/14/2024 14  10 - 40 U/L Final    ALT 08/14/2024 13  10 - 44 U/L Final    eGFR 08/14/2024 48.7 (A)  >60 mL/min/1.73 m^2 Final    Anion Gap 08/14/2024 12  8 - 16 mmol/L Final         Imaging:     CT Abdomen and Pelvis 10/04/23    Base of Chest:     There is no dense lobar consolidation or pleural effusion.  Included cardiac chambers are within normal limits.     Liver, pancreas, spleen, gallbladder:     There is no acute abnormality of the liver, pancreas or spleen.  There are no inflammatory changes in the gallbladder fossa.  Cholecystectomy clips.     Kidneys:     There is no obstructing urinary tract calculus, hydronephrosis or hydroureter. The adrenal glands are not enlarged.     Bowel:     The lack of oral contrast limits evaluation of the bowel.  There is no acute abnormality of the stomach. There is no small bowel dilatation.  There is no acute abnormality of the colon.  A few scattered diverticular seen in the colon.  The rectum is within normal limits.  The appendix is not clearly visualized.     Other:     There is no free fluid, free air or pathologic lymph node enlargement. The IVC is within normal limits.  There is no aneurysmal dilatation of the abdominal aorta.     Pelvis:     The urinary bladder is within normal limits. There is no significant free fluid in the pelvis.  The uterus and ovaries are not clearly visualized.     Body wall:     There is no acute body wall finding.       Assessment:       1. Leukocytosis, unspecified type    2. Lichen sclerosus    3. Meniere's disease, unspecified laterality    4. Weight loss               Plan:     Leukocytosis - Pt with a persistent leukocytosis in the setting of active Lichen sclerosis  -WBC stable  -BM discussed if counts worse  -Will monitor    Lichen Slcerosus - pt under the care of gynecologist Dr Savannah Salazar with Gynecology  -Pt underwent laser treatment for lichen sclerosis 9/08/23 and 10/20/23 which may have prompted  leukocytosis  -Management per gynecology    Meniere's Disease - pt following with Dr Boggs  -Management per ENT  -Likely contributing to nausea and weight loss    Weight Loss - felt to be due to nausea from meniere's disease  -Will monitor  -Pt up to date on cancer screenign  -Will consider CT C/A/P if symptoms persist      Med Onc Chart Routing      Follow up with physician 3 months. The patient needs a CBC, CMP, LDH, uric acid in 3 months with a return visit.   Follow up with SINDI    Infusion scheduling note    Injection scheduling note    Labs    Imaging    Pharmacy appointment    Other referrals                   Leonides Duvall MD  [facility]  Hematology and Oncology  [facility]   [address] [place] [address]   O: [phone]  F: [phone]

## 2024-08-14 ENCOUNTER — LAB VISIT (OUTPATIENT)
Dept: LAB | Facility: HOSPITAL | Age: 69
End: 2024-08-14
Attending: ORTHOPAEDIC SURGERY
Payer: MEDICARE

## 2024-08-14 ENCOUNTER — OFFICE VISIT (OUTPATIENT)
Dept: HEMATOLOGY/ONCOLOGY | Facility: CLINIC | Age: 69
End: 2024-08-14
Payer: MEDICARE

## 2024-08-14 VITALS
TEMPERATURE: 97 F | DIASTOLIC BLOOD PRESSURE: 88 MMHG | HEIGHT: 67 IN | WEIGHT: 145.31 LBS | BODY MASS INDEX: 22.81 KG/M2 | SYSTOLIC BLOOD PRESSURE: 130 MMHG | RESPIRATION RATE: 12 BRPM

## 2024-08-14 DIAGNOSIS — H81.09 MENIERE'S DISEASE, UNSPECIFIED LATERALITY: ICD-10-CM

## 2024-08-14 DIAGNOSIS — R63.4 WEIGHT LOSS: ICD-10-CM

## 2024-08-14 DIAGNOSIS — D72.829 LEUKOCYTOSIS, UNSPECIFIED TYPE: Primary | ICD-10-CM

## 2024-08-14 DIAGNOSIS — D72.829 LEUKOCYTOSIS, UNSPECIFIED TYPE: ICD-10-CM

## 2024-08-14 DIAGNOSIS — H81.09 MENIERE'S DISEASE, UNSPECIFIED LATERALITY: Primary | ICD-10-CM

## 2024-08-14 DIAGNOSIS — L90.0 LICHEN SCLEROSUS: ICD-10-CM

## 2024-08-14 LAB
ALBUMIN SERPL BCP-MCNC: 3.9 G/DL (ref 3.5–5.2)
ALP SERPL-CCNC: 73 U/L (ref 55–135)
ALT SERPL W/O P-5'-P-CCNC: 13 U/L (ref 10–44)
ANION GAP SERPL CALC-SCNC: 12 MMOL/L (ref 8–16)
AST SERPL-CCNC: 14 U/L (ref 10–40)
BASOPHILS # BLD AUTO: 0.11 K/UL (ref 0–0.2)
BASOPHILS NFR BLD: 0.8 % (ref 0–1.9)
BILIRUB SERPL-MCNC: 0.7 MG/DL (ref 0.1–1)
BUN SERPL-MCNC: 23 MG/DL (ref 8–23)
CALCIUM SERPL-MCNC: 9.9 MG/DL (ref 8.7–10.5)
CHLORIDE SERPL-SCNC: 106 MMOL/L (ref 95–110)
CO2 SERPL-SCNC: 21 MMOL/L (ref 23–29)
CREAT SERPL-MCNC: 1.2 MG/DL (ref 0.5–1.4)
DIFFERENTIAL METHOD BLD: ABNORMAL
EOSINOPHIL # BLD AUTO: 0.2 K/UL (ref 0–0.5)
EOSINOPHIL NFR BLD: 1.8 % (ref 0–8)
ERYTHROCYTE [DISTWIDTH] IN BLOOD BY AUTOMATED COUNT: 12.8 % (ref 11.5–14.5)
EST. GFR  (NO RACE VARIABLE): 48.7 ML/MIN/1.73 M^2
GLUCOSE SERPL-MCNC: 90 MG/DL (ref 70–110)
HCT VFR BLD AUTO: 38.6 % (ref 37–48.5)
HGB BLD-MCNC: 13.3 G/DL (ref 12–16)
IMM GRANULOCYTES # BLD AUTO: 0.05 K/UL (ref 0–0.04)
IMM GRANULOCYTES NFR BLD AUTO: 0.4 % (ref 0–0.5)
LYMPHOCYTES # BLD AUTO: 3.5 K/UL (ref 1–4.8)
LYMPHOCYTES NFR BLD: 27 % (ref 18–48)
MCH RBC QN AUTO: 29.6 PG (ref 27–31)
MCHC RBC AUTO-ENTMCNC: 34.5 G/DL (ref 32–36)
MCV RBC AUTO: 86 FL (ref 82–98)
MONOCYTES # BLD AUTO: 0.9 K/UL (ref 0.3–1)
MONOCYTES NFR BLD: 6.9 % (ref 4–15)
NEUTROPHILS # BLD AUTO: 8.2 K/UL (ref 1.8–7.7)
NEUTROPHILS NFR BLD: 63.1 % (ref 38–73)
NRBC BLD-RTO: 0 /100 WBC
PLATELET # BLD AUTO: 356 K/UL (ref 150–450)
PMV BLD AUTO: 10.5 FL (ref 9.2–12.9)
POTASSIUM SERPL-SCNC: 3.5 MMOL/L (ref 3.5–5.1)
PROT SERPL-MCNC: 7.6 G/DL (ref 6–8.4)
RBC # BLD AUTO: 4.49 M/UL (ref 4–5.4)
SODIUM SERPL-SCNC: 139 MMOL/L (ref 136–145)
WBC # BLD AUTO: 13.06 K/UL (ref 3.9–12.7)

## 2024-08-14 PROCEDURE — 99213 OFFICE O/P EST LOW 20 MIN: CPT | Mod: PBBFAC,PN | Performed by: INTERNAL MEDICINE

## 2024-08-14 PROCEDURE — 80053 COMPREHEN METABOLIC PANEL: CPT | Mod: PN

## 2024-08-14 PROCEDURE — 85025 COMPLETE CBC W/AUTO DIFF WBC: CPT | Mod: PN

## 2024-08-14 PROCEDURE — 36415 COLL VENOUS BLD VENIPUNCTURE: CPT | Mod: PN

## 2024-08-14 PROCEDURE — 99999 PR PBB SHADOW E&M-EST. PATIENT-LVL III: CPT | Mod: PBBFAC,,, | Performed by: INTERNAL MEDICINE

## 2024-08-14 PROCEDURE — 99214 OFFICE O/P EST MOD 30 MIN: CPT | Mod: S$PBB,,, | Performed by: INTERNAL MEDICINE

## 2024-08-20 NOTE — TELEPHONE ENCOUNTER
----- Message from Eve Davis LPN sent at 12/29/2020  9:15 AM CST -----  Dr. Cain would like patient to see Dr. Edmondson for her left foot. Not healing after surgery. Patient has follow up with Dr. Cain on 1/5/2020. Thanks, Eve     4 = No assist / stand by assistance

## 2024-10-01 ENCOUNTER — PATIENT MESSAGE (OUTPATIENT)
Dept: HEMATOLOGY/ONCOLOGY | Facility: CLINIC | Age: 69
End: 2024-10-01
Payer: MEDICARE

## 2024-10-01 DIAGNOSIS — R45.89 ANXIETY ABOUT HEALTH: Primary | ICD-10-CM

## 2024-11-14 ENCOUNTER — LAB VISIT (OUTPATIENT)
Dept: LAB | Facility: HOSPITAL | Age: 69
End: 2024-11-14
Attending: INTERNAL MEDICINE
Payer: MEDICARE

## 2024-11-14 ENCOUNTER — OFFICE VISIT (OUTPATIENT)
Dept: HEMATOLOGY/ONCOLOGY | Facility: CLINIC | Age: 69
End: 2024-11-14
Payer: MEDICARE

## 2024-11-14 VITALS
BODY MASS INDEX: 22.66 KG/M2 | RESPIRATION RATE: 14 BRPM | WEIGHT: 144.38 LBS | TEMPERATURE: 97 F | OXYGEN SATURATION: 98 % | SYSTOLIC BLOOD PRESSURE: 118 MMHG | HEART RATE: 84 BPM | DIASTOLIC BLOOD PRESSURE: 80 MMHG | HEIGHT: 67 IN

## 2024-11-14 DIAGNOSIS — D72.829 LEUKOCYTOSIS, UNSPECIFIED TYPE: ICD-10-CM

## 2024-11-14 DIAGNOSIS — Q99.9 CHROMOSOMAL ABNORMALITY, UNSPECIFIED: ICD-10-CM

## 2024-11-14 DIAGNOSIS — D72.829 LEUKOCYTOSIS, UNSPECIFIED TYPE: Primary | ICD-10-CM

## 2024-11-14 DIAGNOSIS — L90.0 LICHEN SCLEROSUS: ICD-10-CM

## 2024-11-14 DIAGNOSIS — H81.09 MENIERE'S DISEASE, UNSPECIFIED LATERALITY: ICD-10-CM

## 2024-11-14 LAB
ALBUMIN SERPL BCP-MCNC: 3.8 G/DL (ref 3.5–5.2)
ALP SERPL-CCNC: 58 U/L (ref 40–150)
ALT SERPL W/O P-5'-P-CCNC: 13 U/L (ref 10–44)
ANION GAP SERPL CALC-SCNC: 9 MMOL/L (ref 8–16)
AST SERPL-CCNC: 12 U/L (ref 10–40)
BASOPHILS # BLD AUTO: 0.08 K/UL (ref 0–0.2)
BASOPHILS NFR BLD: 0.6 % (ref 0–1.9)
BILIRUB SERPL-MCNC: 1 MG/DL (ref 0.1–1)
BUN SERPL-MCNC: 21 MG/DL (ref 8–23)
CALCIUM SERPL-MCNC: 9.1 MG/DL (ref 8.7–10.5)
CHLORIDE SERPL-SCNC: 108 MMOL/L (ref 95–110)
CO2 SERPL-SCNC: 20 MMOL/L (ref 23–29)
CREAT SERPL-MCNC: 1.2 MG/DL (ref 0.5–1.4)
DIFFERENTIAL METHOD BLD: ABNORMAL
EOSINOPHIL # BLD AUTO: 0.2 K/UL (ref 0–0.5)
EOSINOPHIL NFR BLD: 1.2 % (ref 0–8)
ERYTHROCYTE [DISTWIDTH] IN BLOOD BY AUTOMATED COUNT: 12.3 % (ref 11.5–14.5)
EST. GFR  (NO RACE VARIABLE): 48.7 ML/MIN/1.73 M^2
GLUCOSE SERPL-MCNC: 93 MG/DL (ref 70–110)
HCT VFR BLD AUTO: 40 % (ref 37–48.5)
HGB BLD-MCNC: 13.5 G/DL (ref 12–16)
IMM GRANULOCYTES # BLD AUTO: 0.06 K/UL (ref 0–0.04)
IMM GRANULOCYTES NFR BLD AUTO: 0.5 % (ref 0–0.5)
LDH SERPL L TO P-CCNC: 146 U/L (ref 110–260)
LYMPHOCYTES # BLD AUTO: 2.9 K/UL (ref 1–4.8)
LYMPHOCYTES NFR BLD: 22.2 % (ref 18–48)
MCH RBC QN AUTO: 30.2 PG (ref 27–31)
MCHC RBC AUTO-ENTMCNC: 33.8 G/DL (ref 32–36)
MCV RBC AUTO: 90 FL (ref 82–98)
MONOCYTES # BLD AUTO: 0.6 K/UL (ref 0.3–1)
MONOCYTES NFR BLD: 4.9 % (ref 4–15)
NEUTROPHILS # BLD AUTO: 9.1 K/UL (ref 1.8–7.7)
NEUTROPHILS NFR BLD: 70.6 % (ref 38–73)
NRBC BLD-RTO: 0 /100 WBC
PLATELET # BLD AUTO: 360 K/UL (ref 150–450)
PMV BLD AUTO: 10.3 FL (ref 9.2–12.9)
POTASSIUM SERPL-SCNC: 4 MMOL/L (ref 3.5–5.1)
PROT SERPL-MCNC: 7 G/DL (ref 6–8.4)
RBC # BLD AUTO: 4.47 M/UL (ref 4–5.4)
SODIUM SERPL-SCNC: 137 MMOL/L (ref 136–145)
URATE SERPL-MCNC: 4.7 MG/DL (ref 2.4–5.7)
WBC # BLD AUTO: 12.93 K/UL (ref 3.9–12.7)

## 2024-11-14 PROCEDURE — 80053 COMPREHEN METABOLIC PANEL: CPT | Mod: PN | Performed by: INTERNAL MEDICINE

## 2024-11-14 PROCEDURE — 84550 ASSAY OF BLOOD/URIC ACID: CPT | Mod: PN | Performed by: INTERNAL MEDICINE

## 2024-11-14 PROCEDURE — 83615 LACTATE (LD) (LDH) ENZYME: CPT | Mod: PN | Performed by: INTERNAL MEDICINE

## 2024-11-14 PROCEDURE — 36415 COLL VENOUS BLD VENIPUNCTURE: CPT | Mod: PN | Performed by: INTERNAL MEDICINE

## 2024-11-14 PROCEDURE — 85025 COMPLETE CBC W/AUTO DIFF WBC: CPT | Mod: PN | Performed by: INTERNAL MEDICINE

## 2024-11-14 PROCEDURE — 99214 OFFICE O/P EST MOD 30 MIN: CPT | Mod: PBBFAC,PN | Performed by: INTERNAL MEDICINE

## 2024-11-14 PROCEDURE — 99999 PR PBB SHADOW E&M-EST. PATIENT-LVL IV: CPT | Mod: PBBFAC,,, | Performed by: INTERNAL MEDICINE

## 2024-11-14 RX ORDER — SUMATRIPTAN SUCCINATE 100 MG/1
100 TABLET ORAL
COMMUNITY
Start: 2024-10-30

## 2024-11-14 RX ORDER — CLOBETASOL PROPIONATE 0.5 MG/G
OINTMENT TOPICAL
COMMUNITY
Start: 2024-08-21

## 2024-11-14 RX ORDER — TRIAMTERENE/HYDROCHLOROTHIAZID 37.5-25 MG
1 TABLET ORAL
COMMUNITY
Start: 2024-10-07

## 2024-11-14 NOTE — PROGRESS NOTES
PATIENT: Arleen Oconnell  MRN: [id number]  DATE: 11/14/2024      Diagnosis:   1. Leukocytosis, unspecified type    2. Chromosomal abnormality, unspecified    3. Lichen sclerosus    4. Meniere's disease, unspecified laterality              Chief Complaint: Leukocytosis, unspecified type (3 month follow up )      Oncologic History:      Oncologic History     Oncologic Treatment     Pathology           Subjective:    Interval History: Ms. Oconnell is a 69 y.o. female with Anxiety, asthma, depression, fibromyalgia, GERD, insomnia, migraines, vertigo, lichen sclerosus s/p laser treatment who presents for leukocytosis.  Since the last clinic visit the patient states she has been battling recurrent symptoms of Meniere's disease with balance difficulty and sensation of fullness in her head.  The patient denies CP, cough, SOB, abdominal pain, nausea, vomiting, constipation, diarrhea.  The patient denies fever, chills, night sweats, weight loss, new lumps or bumps, easy bruising or bleeding.    Prior History:  The patient underwent biopsy of the labia in the past showing lichen sclerosis.  Pt is under the care of Dr Savannah Salazar with Gynecology.  Pt states she underwent laser treatment for lichen sclerosis 9/08/23 and 10/20/23.  The patient's WBC has been elevated since at least 10/04/23 with neutrophilia.   The patient underwent labs on 11/08/2023 showed normal white blood cell count at 11.52 with mildly elevated immature granulocytes at 0.06 k/uL.  Pathologist review showed relatively unremarkable white blood cells with rare immature granulocytes.  No blasts were seen.  CHIARA was negative. LDH, uric acid, CRP, and ferritin were normal.    Past Medical History:   Past Medical History:   Diagnosis Date    Anxiety     Asthma     seasonal    Constipation     Depression     Environmental allergies     Fibromyalgia     GERD (gastroesophageal reflux disease)     History of bronchitis     History of pneumonia     Inner ear anomaly      Insomnia     Migraines     PONV (postoperative nausea and vomiting)     Vertigo        Past Surgical HIstory:   Past Surgical History:   Procedure Laterality Date    BUNIONECTOMY Bilateral     CHOLECYSTECTOMY      CORRECTION OF HAMMER TOE Left 11/30/2020    Procedure: CORRECTION, HAMMER TOE;  Surgeon: Brian Cain MD;  Location: [facility];  Service: Orthopedics;  Laterality: Left;    HAND SURGERY Bilateral     basal joint repair    HYSTERECTOMY      JOINT REPLACEMENT Left 04/01/2014    Left TKA    KNEE ARTHROSCOPY Left 12/17/2013    Left knee scope    LAPAROSCOPIC CHOLECYSTECTOMY N/A 1/15/2020    Procedure: CHOLECYSTECTOMY, LAPAROSCOPIC;  Surgeon: Ian Lomax MD;  Location: [facility];  Service: General;  Laterality: N/A;    OPEN REDUCTION AND INTERNAL FIXATION (ORIF) OF FRACTURE OF METATARSAL BONE Left 11/30/2020    Procedure: ORIF, FRACTURE, METATARSAL BONE;  Surgeon: Brian Cain MD;  Location: [facility];  Service: Orthopedics;  Laterality: Left;  41940 is appropriate CPT       Family History:   Family History   Problem Relation Name Age of Onset    Stroke Mother      Rheum arthritis Father      Breast cancer Sister      Breast cancer Sister         Social History:  reports that she quit smoking about 28 years ago. Her smoking use included cigarettes. She has never used smokeless tobacco. She reports that she does not currently use alcohol after a past usage of about 3.0 - 4.0 standard drinks of alcohol per week. She reports that she does not use drugs.    Allergies:  Review of patient's allergies indicates:   Allergen Reactions    Neomycin-polymyxin-gramicidin     Buspar [buspirone] Other (See Comments)     Drowsy, dizzy and syncope    Morphine Rash    Neosporin [benzalkonium chloride] Other (See Comments)     Blistering to area applied       Medications:  Current Outpatient Medications   Medication Sig Dispense Refill    albuterol (PROVENTIL/VENTOLIN HFA) 90 mcg/actuation inhaler Inhale 2 puffs into  the lungs 4 (four) times daily.      ascorbic acid, vitamin C, (VITAMIN C) 1000 MG tablet Take 1,000 mg by mouth once daily.      biotin 5,000 mcg Subl Place 5,000 mcg under the tongue.      buPROPion (WELLBUTRIN XL) 300 MG 24 hr tablet TAKE 1 TABLET BY MOUTH EVERY DAY. 30 tablet 4    CALCIUM ORAL Take 1 tablet by mouth once daily.       cetirizine (ZYRTEC) 10 MG tablet Take 10 mg by mouth once daily.      cholecalciferol, vitamin D3, 125 mcg (5,000 unit) Tab Take 1 tablet by mouth once daily.      clobetasol 0.05% (TEMOVATE) 0.05 % Oint APPLY TOPICALLY TO THE AFFECTED AREA(S) (THIN LAYER) TWICE DAILY      clonazePAM (KLONOPIN) 1 MG tablet TAKE ONE TABLET BY MOUTH TWICE DAILY 60 tablet 3    gabapentin (NEURONTIN) 300 MG capsule TAKE 1 CAPSULE (300 MG TOTAL) BY MOUTH ONCE DAILY 30 capsule 11    OMEPRAZOLE (PRILOSEC ORAL) Take 20 mg by mouth once daily.       ondansetron (ZOFRAN-ODT) 4 MG TbDL Take 1 tablet (4 mg total) by mouth every 8 (eight) hours as needed. 30 tablet 6    progesterone (PROMETRIUM) 200 MG capsule Take 200 mg by mouth every evening.       sumatriptan (IMITREX) 100 MG tablet 100 mg every 2 (two) hours as needed for Migraine.      triamterene-hydrochlorothiazide 37.5-25 mg (MAXZIDE-25) 37.5-25 mg per tablet Take 1 tablet by mouth.      zinc gluconate 50 mg tablet Take 50 mg by mouth once daily.      ergocalciferol, vitamin D2, (VITAMIN D ORAL) Take 10,000 mg by mouth once daily. (Patient not taking: Reported on 11/14/2024)       No current facility-administered medications for this visit.       Review of Systems   Constitutional:  Positive for appetite change and unexpected weight change. Negative for chills, fatigue and fever.   HENT:          Sensation of fullness in the head   Respiratory:  Negative for cough and shortness of breath.    Cardiovascular:  Negative for chest pain and palpitations.   Gastrointestinal:  Positive for nausea. Negative for abdominal pain, constipation, diarrhea and  "vomiting.   Genitourinary:  Negative for frequency.   Skin:  Negative for rash.   Neurological:  Positive for light-headedness. Negative for headaches.   Hematological:  Negative for adenopathy. Does not bruise/bleed easily.   Psychiatric/Behavioral:  The patient is nervous/anxious.        ECOG Performance Status: 1   Objective:      Vitals:   Vitals:    11/14/24 1246   BP: 118/80   BP Location: Right arm   Patient Position: Sitting   Pulse: 84   Resp: 14   Temp: 97.3 °F (36.3 °C)   TempSrc: Temporal   SpO2: 98%   Weight: 65.5 kg (144 lb 6.4 oz)   Height: 5' 7" (1.702 m)           Physical Exam  Constitutional:       General: She is not in acute distress.     Appearance: She is well-developed. She is not diaphoretic.   HENT:      Head: Normocephalic and atraumatic.   Cardiovascular:      Rate and Rhythm: Normal rate and regular rhythm.      Heart sounds: Normal heart sounds. No murmur heard.     No friction rub. No gallop.   Pulmonary:      Effort: Pulmonary effort is normal. No respiratory distress.      Breath sounds: Normal breath sounds. No wheezing or rales.   Chest:      Chest wall: No tenderness.   Abdominal:      General: Bowel sounds are normal. There is no distension.      Palpations: Abdomen is soft. There is no mass.      Tenderness: There is no abdominal tenderness. There is no guarding or rebound.   Lymphadenopathy:      Cervical: No cervical adenopathy.      Upper Body:      Right upper body: No supraclavicular or axillary adenopathy.      Left upper body: No supraclavicular or axillary adenopathy.   Skin:     Findings: No erythema or rash.   Neurological:      Mental Status: She is alert and oriented to person, place, and time.   Psychiatric:         Behavior: Behavior normal.         Laboratory Data:  Lab Visit on 11/14/2024   Component Date Value Ref Range Status    WBC 11/14/2024 12.93 (H)  3.90 - 12.70 K/uL Final    RBC 11/14/2024 4.47  4.00 - 5.40 M/uL Final    Hemoglobin 11/14/2024 13.5  12.0 - " 16.0 g/dL Final    Hematocrit 11/14/2024 40.0  37.0 - 48.5 % Final    MCV 11/14/2024 90  82 - 98 fL Final    MCH 11/14/2024 30.2  27.0 - 31.0 pg Final    MCHC 11/14/2024 33.8  32.0 - 36.0 g/dL Final    RDW 11/14/2024 12.3  11.5 - 14.5 % Final    Platelets 11/14/2024 360  150 - 450 K/uL Final    MPV 11/14/2024 10.3  9.2 - 12.9 fL Final    Immature Granulocytes 11/14/2024 0.5  0.0 - 0.5 % Final    Gran # (ANC) 11/14/2024 9.1 (H)  1.8 - 7.7 K/uL Final    Immature Grans (Abs) 11/14/2024 0.06 (H)  0.00 - 0.04 K/uL Final    Comment: Mild elevation in immature granulocytes is non specific and   can be seen in a variety of conditions including stress response,   acute inflammation, trauma and pregnancy. Correlation with other   laboratory and clinical findings is essential.      Lymph # 11/14/2024 2.9  1.0 - 4.8 K/uL Final    Mono # 11/14/2024 0.6  0.3 - 1.0 K/uL Final    Eos # 11/14/2024 0.2  0.0 - 0.5 K/uL Final    Baso # 11/14/2024 0.08  0.00 - 0.20 K/uL Final    nRBC 11/14/2024 0  0 /100 WBC Final    Gran % 11/14/2024 70.6  38.0 - 73.0 % Final    Lymph % 11/14/2024 22.2  18.0 - 48.0 % Final    Mono % 11/14/2024 4.9  4.0 - 15.0 % Final    Eosinophil % 11/14/2024 1.2  0.0 - 8.0 % Final    Basophil % 11/14/2024 0.6  0.0 - 1.9 % Final    Differential Method 11/14/2024 Automated   Final    Sodium 11/14/2024 137  136 - 145 mmol/L Final    Potassium 11/14/2024 4.0  3.5 - 5.1 mmol/L Final    Chloride 11/14/2024 108  95 - 110 mmol/L Final    CO2 11/14/2024 20 (L)  23 - 29 mmol/L Final    Glucose 11/14/2024 93  70 - 110 mg/dL Final    BUN 11/14/2024 21  8 - 23 mg/dL Final    Creatinine 11/14/2024 1.2  0.5 - 1.4 mg/dL Final    Calcium 11/14/2024 9.1  8.7 - 10.5 mg/dL Final    Total Protein 11/14/2024 7.0  6.0 - 8.4 g/dL Final    Albumin 11/14/2024 3.8  3.5 - 5.2 g/dL Final    Total Bilirubin 11/14/2024 1.0  0.1 - 1.0 mg/dL Final    Comment: For infants and newborns, interpretation of results should be based  on gestational age,  weight and in agreement with clinical  observations.    Premature Infant recommended reference ranges:  Up to 24 hours.............<8.0 mg/dL  Up to 48 hours............<12.0 mg/dL  3-5 days..................<15.0 mg/dL  6-29 days.................<15.0 mg/dL      Alkaline Phosphatase 11/14/2024 58  40 - 150 U/L Final    AST 11/14/2024 12  10 - 40 U/L Final    ALT 11/14/2024 13  10 - 44 U/L Final    eGFR 11/14/2024 48.7 (A)  >60 mL/min/1.73 m^2 Final    Anion Gap 11/14/2024 9  8 - 16 mmol/L Final    LD 11/14/2024 146  110 - 260 U/L Final    Results are increased in hemolyzed samples.    Uric Acid 11/14/2024 4.7  2.4 - 5.7 mg/dL Final         Imaging:     CT Abdomen and Pelvis 10/04/23    Base of Chest:     There is no dense lobar consolidation or pleural effusion.  Included cardiac chambers are within normal limits.     Liver, pancreas, spleen, gallbladder:     There is no acute abnormality of the liver, pancreas or spleen.  There are no inflammatory changes in the gallbladder fossa.  Cholecystectomy clips.     Kidneys:     There is no obstructing urinary tract calculus, hydronephrosis or hydroureter. The adrenal glands are not enlarged.     Bowel:     The lack of oral contrast limits evaluation of the bowel.  There is no acute abnormality of the stomach. There is no small bowel dilatation.  There is no acute abnormality of the colon.  A few scattered diverticular seen in the colon.  The rectum is within normal limits.  The appendix is not clearly visualized.     Other:     There is no free fluid, free air or pathologic lymph node enlargement. The IVC is within normal limits.  There is no aneurysmal dilatation of the abdominal aorta.     Pelvis:     The urinary bladder is within normal limits. There is no significant free fluid in the pelvis.  The uterus and ovaries are not clearly visualized.     Body wall:     There is no acute body wall finding.       Assessment:       1. Leukocytosis, unspecified type    2.  Chromosomal abnormality, unspecified    3. Lichen sclerosus    4. Meniere's disease, unspecified laterality                 Plan:     Leukocytosis - Pt with a persistent leukocytosis in the setting of active Lichen sclerosis  -WBC stable  -Will proceed with BM biopsy to rule out BM dyscrasia  -Will monitor    Lichen Slcerosus - pt under the care of gynecologist Dr Savannah Salazar with Gynecology  -Pt underwent laser treatment for lichen sclerosis 9/08/23 and 10/20/23 which may have prompted leukocytosis  -Management per gynecology    Meniere's Disease - pt following with Dr Boggs  -Management per ENT      Med Onc Chart Routing      Follow up with physician Other. Pt needs a bone marrow biopsy schedueld at [facility] with a return appt with me 3 weeks after the biopsy.   Follow up with SINDI    Infusion scheduling note    Injection scheduling note    Labs    Imaging    Pharmacy appointment    Other referrals                   Leonides Duvall MD  [facility]  Hematology and Oncology  [facility]   [address] [place] [address]   O: [phone]  F: [phone]

## 2024-11-15 ENCOUNTER — PATIENT MESSAGE (OUTPATIENT)
Dept: HEMATOLOGY/ONCOLOGY | Facility: CLINIC | Age: 69
End: 2024-11-15
Payer: MEDICARE

## 2024-12-03 ENCOUNTER — PATIENT MESSAGE (OUTPATIENT)
Dept: HEMATOLOGY/ONCOLOGY | Facility: CLINIC | Age: 69
End: 2024-12-03
Payer: MEDICARE

## 2024-12-10 PROBLEM — N18.31 CHRONIC KIDNEY DISEASE, STAGE 3A: Status: ACTIVE | Noted: 2024-12-10

## 2024-12-19 PROBLEM — H81.09 MENIERE'S DISEASE: Status: ACTIVE | Noted: 2024-12-19

## 2024-12-19 NOTE — PROGRESS NOTES
PATIENT: Arleen Oconnell  MRN: [id number]  DATE: 12/23/2024      Diagnosis:   1. Leukocytosis, unspecified type    2. Lichen sclerosus    3. Meniere's disease, unspecified laterality                Chief Complaint: Leukocytosis, unspecified type (1 month follow up )      Oncologic History:      Oncologic History     Oncologic Treatment     Pathology           Subjective:    Interval History: Ms. Oconnell is a 69 y.o. female with Anxiety, asthma, depression, fibromyalgia, GERD, insomnia, migraines, vertigo, lichen sclerosus s/p laser treatment who presents for leukocytosis.  Since the last clinic visit the patient underwent BM biopsy on 12/01/24 showing normocellular marrow at 40-35% cellularity with trilineage hematopoietic elements.  Chromosome analysis showed normal female complement at 46 XX.  Flow cytometry showed no immunophenotypic abnormal T or B-cell population.  BCR-ABL was negative.  Pt states she is feeling well today.  The patient denies CP, cough, SOB, abdominal pain, nausea, vomiting, constipation, diarrhea.  The patient denies fever, chills, night sweats, weight loss, new lumps or bumps, easy bruising or bleeding.    Prior History:  The patient underwent biopsy of the labia in the past showing lichen sclerosis.  Pt is under the care of Dr Savannah Salazar with Gynecology.  Pt states she underwent laser treatment for lichen sclerosis 9/08/23 and 10/20/23.  The patient's WBC has been elevated since at least 10/04/23 with neutrophilia.   The patient underwent labs on 11/08/2023 showed normal white blood cell count at 11.52 with mildly elevated immature granulocytes at 0.06 k/uL.  Pathologist review showed relatively unremarkable white blood cells with rare immature granulocytes.  No blasts were seen.  CHIARA was negative. LDH, uric acid, CRP, and ferritin were normal.    Past Medical History:   Past Medical History:   Diagnosis Date    Anxiety     Asthma     seasonal    Constipation     Depression      Environmental allergies     Fibromyalgia     GERD (gastroesophageal reflux disease)     History of bronchitis     History of pneumonia     Inner ear anomaly     Insomnia     Migraines     PONV (postoperative nausea and vomiting)     Vertigo        Past Surgical HIstory:   Past Surgical History:   Procedure Laterality Date    BUNIONECTOMY Bilateral     CHOLECYSTECTOMY      CORRECTION OF HAMMER TOE Left 11/30/2020    Procedure: CORRECTION, HAMMER TOE;  Surgeon: Brian Cain MD;  Location: [facility];  Service: Orthopedics;  Laterality: Left;    HAND SURGERY Bilateral     basal joint repair    HYSTERECTOMY      JOINT REPLACEMENT Left 04/01/2014    Left TKA    KNEE ARTHROSCOPY Left 12/17/2013    Left knee scope    LAPAROSCOPIC CHOLECYSTECTOMY N/A 1/15/2020    Procedure: CHOLECYSTECTOMY, LAPAROSCOPIC;  Surgeon: Ian Lomax MD;  [facility];  Service: General;  Laterality: N/A;    OPEN REDUCTION AND INTERNAL FIXATION (ORIF) OF FRACTURE OF METATARSAL BONE Left 11/30/2020    Procedure: ORIF, FRACTURE, METATARSAL BONE;  Surgeon: Brian Cain MD;  Location: [facility];  Service: Orthopedics;  Laterality: Left;  00130 is appropriate CPT       Family History:   Family History   Problem Relation Name Age of Onset    Stroke Mother      Rheum arthritis Father      Breast cancer Sister      Breast cancer Sister         Social History:  reports that she quit smoking about 28 years ago. Her smoking use included cigarettes. She has never used smokeless tobacco. She reports that she does not currently use alcohol after a past usage of about 3.0 - 4.0 standard drinks of alcohol per week. She reports that she does not use drugs.    Allergies:  Review of patient's allergies indicates:   Allergen Reactions    Neomycin-polymyxin-gramicidin     Buspar [buspirone] Other (See Comments)     Drowsy, dizzy and syncope    Morphine Rash    Neosporin [benzalkonium chloride] Other (See Comments)     Blistering to area applied        Medications:  Current Outpatient Medications   Medication Sig Dispense Refill    albuterol (PROVENTIL/VENTOLIN HFA) 90 mcg/actuation inhaler Inhale 2 puffs into the lungs every 6 (six) hours as needed.      ascorbic acid, vitamin C, (VITAMIN C) 1000 MG tablet Take 1,000 mg by mouth once daily.      biotin 5,000 mcg Subl Place 5,000 mcg under the tongue.      buPROPion (WELLBUTRIN XL) 300 MG 24 hr tablet TAKE ONE TABLET BY MOUTH EVERY DAY 30 tablet 4    CALCIUM ORAL Take 1 tablet by mouth once daily.       cetirizine (ZYRTEC) 10 MG tablet Take 10 mg by mouth once daily.      cholecalciferol, vitamin D3, 125 mcg (5,000 unit) Tab Take 1 tablet by mouth once daily.      clobetasol 0.05% (TEMOVATE) 0.05 % Oint APPLY TOPICALLY TO THE AFFECTED AREA(S) (THIN LAYER) TWICE DAILY      clonazePAM (KLONOPIN) 1 MG tablet TAKE ONE TABLET BY MOUTH TWICE DAILY 60 tablet 3    gabapentin (NEURONTIN) 300 MG capsule TAKE 1 CAPSULE (300 MG TOTAL) BY MOUTH ONCE DAILY 30 capsule 11    OMEPRAZOLE (PRILOSEC ORAL) Take 20 mg by mouth once daily.       ondansetron (ZOFRAN-ODT) 4 MG TbDL Take 1 tablet (4 mg total) by mouth every 8 (eight) hours as needed. 30 tablet 6    progesterone (PROMETRIUM) 200 MG capsule Take 200 mg by mouth every evening.       sumatriptan (IMITREX) 100 MG tablet 100 mg every 2 (two) hours as needed for Migraine.      zinc gluconate 50 mg tablet Take 50 mg by mouth once daily.       No current facility-administered medications for this visit.       Review of Systems   Constitutional:  Negative for chills, fatigue, fever and unexpected weight change.   Respiratory:  Negative for cough and shortness of breath.    Cardiovascular:  Negative for chest pain and palpitations.   Gastrointestinal:  Negative for abdominal pain, constipation, diarrhea, nausea and vomiting.   Skin:  Negative for rash.   Neurological:  Negative for headaches.   Hematological:  Negative for adenopathy. Does not bruise/bleed easily.       ECOG  "Performance Status: 1   Objective:      Vitals:   Vitals:    12/20/24 0931   BP: 138/80   BP Location: Right arm   Patient Position: Sitting   Pulse: 100   Resp: 14   Temp: 98.3 °F (36.8 °C)   TempSrc: Temporal   SpO2: 95%   Weight: 65.6 kg (144 lb 10 oz)   Height: 5' 7" (1.702 m)             Physical Exam  Constitutional:       General: She is not in acute distress.     Appearance: She is well-developed. She is not ill-appearing, toxic-appearing or diaphoretic.   Neurological:      Mental Status: She is alert and oriented to person, place, and time.         Laboratory Data:  No visits with results within 1 Week(s) from this visit.   Latest known visit with results is:   Hospital Outpatient Visit on 12/02/2024   Component Date Value Ref Range Status    WBC 12/02/2024 11.78  3.90 - 12.70 K/uL Final    RBC 12/02/2024 4.62  4.00 - 5.40 M/uL Final    Hemoglobin 12/02/2024 13.6  12.0 - 16.0 g/dL Final    Hematocrit 12/02/2024 41.5  37.0 - 48.5 % Final    MCV 12/02/2024 90  82 - 98 fL Final    MCH 12/02/2024 29.4  27.0 - 31.0 pg Final    MCHC 12/02/2024 32.8  32.0 - 36.0 g/dL Final    RDW 12/02/2024 12.5  11.5 - 14.5 % Final    Platelets 12/02/2024 339  150 - 450 K/uL Final    MPV 12/02/2024 10.6  9.2 - 12.9 fL Final    Immature Granulocytes 12/02/2024 0.4  0.0 - 0.5 % Final    Gran # (ANC) 12/02/2024 7.4  1.8 - 7.7 K/uL Final    Immature Grans (Abs) 12/02/2024 0.05 (H)  0.00 - 0.04 K/uL Final    Comment: Mild elevation in immature granulocytes is non specific and   can be seen in a variety of conditions including stress response,   acute inflammation, trauma and pregnancy. Correlation with other   laboratory and clinical findings is essential.      Lymph # 12/02/2024 3.5  1.0 - 4.8 K/uL Final    Mono # 12/02/2024 0.6  0.3 - 1.0 K/uL Final    Eos # 12/02/2024 0.2  0.0 - 0.5 K/uL Final    Baso # 12/02/2024 0.10  0.00 - 0.20 K/uL Final    nRBC 12/02/2024 0  0 /100 WBC Final    Gran % 12/02/2024 62.8  38.0 - 73.0 % Final    " Lymph % 12/02/2024 29.4  18.0 - 48.0 % Final    Mono % 12/02/2024 5.3  4.0 - 15.0 % Final    Eosinophil % 12/02/2024 1.3  0.0 - 8.0 % Final    Basophil % 12/02/2024 0.8  0.0 - 1.9 % Final    Differential Method 12/02/2024 Automated   Final    PT 12/02/2024 12.3  11.8 - 14.7 sec Final    PT normal range is not established for pediatrics.    INR 12/02/2024 0.9   Final    aPTT 12/02/2024 26.0  24.6 - 36.7 sec Final    PTT normal range is not established for pediatrics.    Chromosome Analysis, Bone Marrow 12/02/2024 See result image under hyperlink   Final    BCR/ABL1 FISH Result Summary (BM) 12/02/2024 See result image under hyperlink   Final         Imaging:     CT Abdomen and Pelvis 10/04/23    Base of Chest:     There is no dense lobar consolidation or pleural effusion.  Included cardiac chambers are within normal limits.     Liver, pancreas, spleen, gallbladder:     There is no acute abnormality of the liver, pancreas or spleen.  There are no inflammatory changes in the gallbladder fossa.  Cholecystectomy clips.     Kidneys:     There is no obstructing urinary tract calculus, hydronephrosis or hydroureter. The adrenal glands are not enlarged.     Bowel:     The lack of oral contrast limits evaluation of the bowel.  There is no acute abnormality of the stomach. There is no small bowel dilatation.  There is no acute abnormality of the colon.  A few scattered diverticular seen in the colon.  The rectum is within normal limits.  The appendix is not clearly visualized.     Other:     There is no free fluid, free air or pathologic lymph node enlargement. The IVC is within normal limits.  There is no aneurysmal dilatation of the abdominal aorta.     Pelvis:     The urinary bladder is within normal limits. There is no significant free fluid in the pelvis.  The uterus and ovaries are not clearly visualized.     Body wall:     There is no acute body wall finding.       Assessment:       1. Leukocytosis, unspecified type     2. Lichen sclerosus    3. Meniere's disease, unspecified laterality                   Plan:     Leukocytosis - Pt with a persistent leukocytosis in the setting of active Lichen sclerosis  -Bone marrow biopsy on 12/01/24 showed normocellular marrow at 40-35% cellularity with trilineage hematopoietic elements.  Chromosome analysis showed normal female complement at 46 XX.  Flow cytometry showed no immunophenotypic abnormal T or B-cell population.  BCR-ABL was negative  -No need for monitoring in hematology clinic  -PT can return if counts increase    Lichen Slcerosus - pt under the care of gynecologist Dr Savannah Salazar with Gynecology  -Pt underwent laser treatment for lichen sclerosis 9/08/23 and 10/20/23 which may have prompted leukocytosis  -Management per gynecology    Meniere's Disease - pt following with Dr Boggs  -Management per ENT      Med Onc Chart Routing      Follow up with physician No follow up needed.   Follow up with SINDI    Infusion scheduling note    Injection scheduling note    Labs    Imaging    Pharmacy appointment    Other referrals                   Leonides Duvall MD  [facility]  Hematology and Oncology  [facility] [address] [place] [address]   O: [phone]  F: [phone]

## 2024-12-20 ENCOUNTER — OFFICE VISIT (OUTPATIENT)
Dept: HEMATOLOGY/ONCOLOGY | Facility: CLINIC | Age: 69
End: 2024-12-20
Payer: MEDICARE

## 2024-12-20 VITALS
TEMPERATURE: 98 F | WEIGHT: 144.63 LBS | OXYGEN SATURATION: 95 % | DIASTOLIC BLOOD PRESSURE: 80 MMHG | RESPIRATION RATE: 14 BRPM | HEIGHT: 67 IN | BODY MASS INDEX: 22.7 KG/M2 | SYSTOLIC BLOOD PRESSURE: 138 MMHG | HEART RATE: 100 BPM

## 2024-12-20 DIAGNOSIS — D72.829 LEUKOCYTOSIS, UNSPECIFIED TYPE: Primary | ICD-10-CM

## 2024-12-20 DIAGNOSIS — H81.09 MENIERE'S DISEASE, UNSPECIFIED LATERALITY: ICD-10-CM

## 2024-12-20 DIAGNOSIS — L90.0 LICHEN SCLEROSUS: ICD-10-CM

## 2024-12-20 PROCEDURE — 99999 PR PBB SHADOW E&M-EST. PATIENT-LVL IV: CPT | Mod: PBBFAC,,, | Performed by: INTERNAL MEDICINE

## 2024-12-20 PROCEDURE — 99212 OFFICE O/P EST SF 10 MIN: CPT | Mod: S$PBB,,, | Performed by: INTERNAL MEDICINE

## 2024-12-20 PROCEDURE — 99214 OFFICE O/P EST MOD 30 MIN: CPT | Mod: PBBFAC,PN | Performed by: INTERNAL MEDICINE

## 2025-03-04 DIAGNOSIS — R20.2 PARESTHESIA OF LEFT FOOT: ICD-10-CM

## 2025-03-05 RX ORDER — GABAPENTIN 300 MG/1
300 CAPSULE ORAL DAILY
Qty: 30 CAPSULE | Refills: 11 | Status: SHIPPED | OUTPATIENT
Start: 2025-03-05 | End: 2026-03-05

## (undated) DEVICE — PADDING CAST 4IN SPECIALIST

## (undated) DEVICE — SEE MEDLINE ITEM 157131

## (undated) DEVICE — Device

## (undated) DEVICE — BLADE SURG #15 CARBON STEEL

## (undated) DEVICE — SEE MEDLINE ITEM 152622

## (undated) DEVICE — UNDERGLOVE BIOGEL PI SZ 6.5 LF

## (undated) DEVICE — BLADE SAW MED NAR 18.0X5.5MM

## (undated) DEVICE — GLOVE SURGEONS ULTRA TOUCH 6.5

## (undated) DEVICE — DURAPREP SURG SCRUB 26ML

## (undated) DEVICE — SEE MEDLINE ITEM 146298

## (undated) DEVICE — SLEEVE SCD EXPRESS CALF MEDIUM

## (undated) DEVICE — ELECTRODE REM PLYHSV RETURN 9

## (undated) DEVICE — ALCOHOL 70% ISOP RUBBING 4OZ

## (undated) DEVICE — GAUZE SPONGE 8X4 12 PLY

## (undated) DEVICE — DRAPE STERI U-SHAPED 47X51IN

## (undated) DEVICE — SEE MEDLINE ITEM 157117

## (undated) DEVICE — SOL 9P NACL IRR PIC IL

## (undated) DEVICE — DRESSING XEROFORM FOIL PK 1X8

## (undated) DEVICE — SUT ETHILON 3-0 PS2 18 BLK

## (undated) DEVICE — SEE MEDLINE ITEM 146270

## (undated) DEVICE — SUT MONOCRYL 2-0 S UND

## (undated) DEVICE — RETRIEVER SUTURE HEWSON DISP

## (undated) DEVICE — NDL 22GA X1 1/2 REG BEVEL